# Patient Record
Sex: MALE | Race: BLACK OR AFRICAN AMERICAN | Employment: OTHER | URBAN - METROPOLITAN AREA
[De-identification: names, ages, dates, MRNs, and addresses within clinical notes are randomized per-mention and may not be internally consistent; named-entity substitution may affect disease eponyms.]

---

## 2020-05-14 ENCOUNTER — APPOINTMENT (OUTPATIENT)
Dept: CT IMAGING | Age: 70
DRG: 065 | End: 2020-05-14
Attending: EMERGENCY MEDICINE
Payer: COMMERCIAL

## 2020-05-14 ENCOUNTER — HOSPITAL ENCOUNTER (INPATIENT)
Age: 70
LOS: 4 days | Discharge: HOME HEALTH | DRG: 065 | End: 2020-05-18
Attending: EMERGENCY MEDICINE | Admitting: INTERNAL MEDICINE
Payer: COMMERCIAL

## 2020-05-14 DIAGNOSIS — I63.9 ACUTE ISCHEMIC STROKE (HCC): ICD-10-CM

## 2020-05-14 DIAGNOSIS — I16.1 HYPERTENSIVE EMERGENCY: ICD-10-CM

## 2020-05-14 DIAGNOSIS — N28.9 RENAL INSUFFICIENCY: ICD-10-CM

## 2020-05-14 DIAGNOSIS — R29.810 FACIAL DROOP: Primary | ICD-10-CM

## 2020-05-14 DIAGNOSIS — R77.8 ELEVATED TROPONIN: ICD-10-CM

## 2020-05-14 LAB
AMPHET UR QL SCN: NEGATIVE
ANION GAP SERPL CALC-SCNC: 5 MMOL/L (ref 3–18)
BARBITURATES UR QL SCN: NEGATIVE
BASOPHILS # BLD: 0 K/UL (ref 0–0.1)
BASOPHILS NFR BLD: 0 % (ref 0–2)
BENZODIAZ UR QL: NEGATIVE
BUN SERPL-MCNC: 25 MG/DL (ref 7–18)
BUN/CREAT SERPL: 17 (ref 12–20)
CALCIUM SERPL-MCNC: 9.1 MG/DL (ref 8.5–10.1)
CANNABINOIDS UR QL SCN: NEGATIVE
CHLORIDE SERPL-SCNC: 105 MMOL/L (ref 100–111)
CK MB CFR SERPL CALC: 1.1 % (ref 0–4)
CK MB SERPL-MCNC: 6.4 NG/ML (ref 5–25)
CK SERPL-CCNC: 593 U/L (ref 39–308)
CO2 SERPL-SCNC: 28 MMOL/L (ref 21–32)
COCAINE UR QL SCN: NEGATIVE
CREAT SERPL-MCNC: 1.45 MG/DL (ref 0.6–1.3)
DIFFERENTIAL METHOD BLD: ABNORMAL
EOSINOPHIL # BLD: 0.1 K/UL (ref 0–0.4)
EOSINOPHIL NFR BLD: 1 % (ref 0–5)
ERYTHROCYTE [DISTWIDTH] IN BLOOD BY AUTOMATED COUNT: 16.1 % (ref 11.6–14.5)
GLUCOSE BLD STRIP.AUTO-MCNC: 94 MG/DL (ref 70–110)
GLUCOSE SERPL-MCNC: 95 MG/DL (ref 74–99)
HCT VFR BLD AUTO: 39.2 % (ref 36–48)
HDSCOM,HDSCOM: NORMAL
HGB BLD-MCNC: 12.7 G/DL (ref 13–16)
LYMPHOCYTES # BLD: 2 K/UL (ref 0.9–3.6)
LYMPHOCYTES NFR BLD: 23 % (ref 21–52)
MCH RBC QN AUTO: 22.4 PG (ref 24–34)
MCHC RBC AUTO-ENTMCNC: 32.4 G/DL (ref 31–37)
MCV RBC AUTO: 69 FL (ref 74–97)
METHADONE UR QL: NEGATIVE
MONOCYTES # BLD: 0.7 K/UL (ref 0.05–1.2)
MONOCYTES NFR BLD: 8 % (ref 3–10)
NEUTS SEG # BLD: 6 K/UL (ref 1.8–8)
NEUTS SEG NFR BLD: 68 % (ref 40–73)
OPIATES UR QL: NEGATIVE
PCP UR QL: NEGATIVE
PLATELET # BLD AUTO: 218 K/UL (ref 135–420)
PMV BLD AUTO: 11.6 FL (ref 9.2–11.8)
POTASSIUM SERPL-SCNC: 3.8 MMOL/L (ref 3.5–5.5)
RBC # BLD AUTO: 5.68 M/UL (ref 4.7–5.5)
SODIUM SERPL-SCNC: 138 MMOL/L (ref 136–145)
TROPONIN I SERPL-MCNC: 0.09 NG/ML (ref 0–0.04)
WBC # BLD AUTO: 8.8 K/UL (ref 4.6–13.2)

## 2020-05-14 PROCEDURE — 96374 THER/PROPH/DIAG INJ IV PUSH: CPT

## 2020-05-14 PROCEDURE — 82962 GLUCOSE BLOOD TEST: CPT

## 2020-05-14 PROCEDURE — 82550 ASSAY OF CK (CPK): CPT

## 2020-05-14 PROCEDURE — 93005 ELECTROCARDIOGRAM TRACING: CPT

## 2020-05-14 PROCEDURE — 99285 EMERGENCY DEPT VISIT HI MDM: CPT

## 2020-05-14 PROCEDURE — 85025 COMPLETE CBC W/AUTO DIFF WBC: CPT

## 2020-05-14 PROCEDURE — 80307 DRUG TEST PRSMV CHEM ANLYZR: CPT

## 2020-05-14 PROCEDURE — 74011250636 HC RX REV CODE- 250/636: Performed by: EMERGENCY MEDICINE

## 2020-05-14 PROCEDURE — 65270000029 HC RM PRIVATE

## 2020-05-14 PROCEDURE — 80048 BASIC METABOLIC PNL TOTAL CA: CPT

## 2020-05-14 PROCEDURE — 74011250637 HC RX REV CODE- 250/637: Performed by: INTERNAL MEDICINE

## 2020-05-14 PROCEDURE — 70450 CT HEAD/BRAIN W/O DYE: CPT

## 2020-05-14 PROCEDURE — 74011250637 HC RX REV CODE- 250/637: Performed by: EMERGENCY MEDICINE

## 2020-05-14 RX ORDER — ASPIRIN 325 MG
325 TABLET ORAL
Status: DISCONTINUED | OUTPATIENT
Start: 2020-05-14 | End: 2020-05-14

## 2020-05-14 RX ORDER — ASPIRIN 600 MG/1
300 SUPPOSITORY RECTAL
Status: DISCONTINUED | OUTPATIENT
Start: 2020-05-14 | End: 2020-05-14

## 2020-05-14 RX ORDER — ASPIRIN 325 MG
325 TABLET ORAL
Status: COMPLETED | OUTPATIENT
Start: 2020-05-14 | End: 2020-05-14

## 2020-05-14 RX ORDER — HYDRALAZINE HYDROCHLORIDE 20 MG/ML
10 INJECTION INTRAMUSCULAR; INTRAVENOUS ONCE
Status: COMPLETED | OUTPATIENT
Start: 2020-05-14 | End: 2020-05-14

## 2020-05-14 RX ORDER — CLOPIDOGREL 300 MG/1
600 TABLET, FILM COATED ORAL
Status: COMPLETED | OUTPATIENT
Start: 2020-05-14 | End: 2020-05-14

## 2020-05-14 RX ADMIN — ASPIRIN 325 MG ORAL TABLET 325 MG: 325 PILL ORAL at 23:36

## 2020-05-14 RX ADMIN — CLOPIDOGREL BISULFATE 600 MG: 300 TABLET, FILM COATED ORAL at 23:36

## 2020-05-14 RX ADMIN — HYDRALAZINE HYDROCHLORIDE 10 MG: 20 INJECTION INTRAMUSCULAR; INTRAVENOUS at 22:37

## 2020-05-15 ENCOUNTER — APPOINTMENT (OUTPATIENT)
Dept: NON INVASIVE DIAGNOSTICS | Age: 70
DRG: 065 | End: 2020-05-15
Attending: INTERNAL MEDICINE
Payer: COMMERCIAL

## 2020-05-15 ENCOUNTER — APPOINTMENT (OUTPATIENT)
Dept: MRI IMAGING | Age: 70
DRG: 065 | End: 2020-05-15
Attending: EMERGENCY MEDICINE
Payer: COMMERCIAL

## 2020-05-15 LAB
AV PEAK GRADIENT: 95.8 MMHG
CHOLEST SERPL-MCNC: 177 MG/DL
ECHO AO ROOT DIAM: 3.29 CM
ECHO AV REGURGITANT PHT: 558.4 CM
ECHO LA AREA 4C: 28.2 CM2
ECHO LA VOL 2C: 141.46 ML (ref 18–58)
ECHO LA VOL 4C: 96.65 ML (ref 18–58)
ECHO LA VOL BP: 129.17 ML (ref 18–58)
ECHO LA VOL/BSA BIPLANE: 72.98 ML/M2 (ref 16–28)
ECHO LA VOLUME INDEX A2C: 79.92 ML/M2 (ref 16–28)
ECHO LA VOLUME INDEX A4C: 54.61 ML/M2 (ref 16–28)
ECHO LV E' LATERAL VELOCITY: 6.59 CM/S
ECHO LV E' SEPTAL VELOCITY: 3.9 CM/S
ECHO LV EDV TEICHHOLZ: 89.11 ML
ECHO LV ESV TEICHHOLZ: 42.22 ML
ECHO LV INTERNAL DIMENSION DIASTOLIC: 5.68 CM (ref 4.2–5.9)
ECHO LV INTERNAL DIMENSION SYSTOLIC: 4.12 CM
ECHO LV IVSD: 2.07 CM (ref 0.6–1)
ECHO LV MASS 2D: 593 G (ref 88–224)
ECHO LV MASS INDEX 2D: 335 G/M2 (ref 49–115)
ECHO LV POSTERIOR WALL DIASTOLIC: 1.41 CM (ref 0.6–1)
ECHO LVOT DIAM: 2 CM
ECHO LVOT PEAK GRADIENT: 4.8 MMHG
ECHO LVOT PEAK VELOCITY: 109.04 CM/S
ECHO LVOT VTI: 22.09 CM
ECHO MV A VELOCITY: 42.4 CM/S
ECHO MV E DECELERATION TIME (DT): 189.9 MS
ECHO MV E VELOCITY: 58.65 CM/S
ECHO MV E/A RATIO: 1.38
ECHO MV E/E' LATERAL: 8.9
ECHO MV E/E' RATIO (AVERAGED): 11.97
ECHO MV E/E' SEPTAL: 15.04
ECHO RV TAPSE: 2.06 CM (ref 1.5–2)
ECHO TV REGURGITANT MAX VELOCITY: 256.51 CM/S
ECHO TV REGURGITANT PEAK GRADIENT: 26.3 MMHG
EST. AVERAGE GLUCOSE BLD GHB EST-MCNC: 134 MG/DL
GLUCOSE BLD STRIP.AUTO-MCNC: 102 MG/DL (ref 70–110)
GLUCOSE BLD STRIP.AUTO-MCNC: 110 MG/DL (ref 70–110)
GLUCOSE BLD STRIP.AUTO-MCNC: 130 MG/DL (ref 70–110)
GLUCOSE BLD STRIP.AUTO-MCNC: 191 MG/DL (ref 70–110)
HBA1C MFR BLD: 6.3 % (ref 4.2–5.6)
HDLC SERPL-MCNC: 63 MG/DL (ref 40–60)
HDLC SERPL: 2.8 {RATIO} (ref 0–5)
IRON SATN MFR SERPL: 16 % (ref 20–50)
IRON SERPL-MCNC: 45 UG/DL (ref 50–175)
LDLC SERPL CALC-MCNC: 105.2 MG/DL (ref 0–100)
LIPID PROFILE,FLP: ABNORMAL
LVFS 2D: 27.4 %
LVOT MG: 2.45 MMHG
LVOT MV: 0.71 CM/S
LVSV (TEICH): 46.88 ML
MV DEC SLOPE: 3.09
PISA AR MAX VEL: 489.4 CM/S
TIBC SERPL-MCNC: 285 UG/DL (ref 250–450)
TRIGL SERPL-MCNC: 44 MG/DL (ref ?–150)
TROPONIN I SERPL-MCNC: 0.1 NG/ML (ref 0–0.04)
TROPONIN I SERPL-MCNC: 0.21 NG/ML (ref 0–0.04)
TROPONIN I SERPL-MCNC: 0.22 NG/ML (ref 0–0.04)
TROPONIN I SERPL-MCNC: 0.29 NG/ML (ref 0–0.04)
VLDLC SERPL CALC-MCNC: 8.8 MG/DL

## 2020-05-15 PROCEDURE — 83036 HEMOGLOBIN GLYCOSYLATED A1C: CPT

## 2020-05-15 PROCEDURE — 92523 SPEECH SOUND LANG COMPREHEN: CPT

## 2020-05-15 PROCEDURE — 74011250637 HC RX REV CODE- 250/637: Performed by: INTERNAL MEDICINE

## 2020-05-15 PROCEDURE — 82962 GLUCOSE BLOOD TEST: CPT

## 2020-05-15 PROCEDURE — 93306 TTE W/DOPPLER COMPLETE: CPT

## 2020-05-15 PROCEDURE — 74011250636 HC RX REV CODE- 250/636

## 2020-05-15 PROCEDURE — 74011250636 HC RX REV CODE- 250/636: Performed by: INTERNAL MEDICINE

## 2020-05-15 PROCEDURE — 70547 MR ANGIOGRAPHY NECK W/O DYE: CPT

## 2020-05-15 PROCEDURE — 36415 COLL VENOUS BLD VENIPUNCTURE: CPT

## 2020-05-15 PROCEDURE — 97162 PT EVAL MOD COMPLEX 30 MIN: CPT

## 2020-05-15 PROCEDURE — 74011000250 HC RX REV CODE- 250: Performed by: INTERNAL MEDICINE

## 2020-05-15 PROCEDURE — 84484 ASSAY OF TROPONIN QUANT: CPT

## 2020-05-15 PROCEDURE — 92610 EVALUATE SWALLOWING FUNCTION: CPT

## 2020-05-15 PROCEDURE — 77010033678 HC OXYGEN DAILY

## 2020-05-15 PROCEDURE — 83540 ASSAY OF IRON: CPT

## 2020-05-15 PROCEDURE — 70544 MR ANGIOGRAPHY HEAD W/O DYE: CPT

## 2020-05-15 PROCEDURE — 65660000000 HC RM CCU STEPDOWN

## 2020-05-15 PROCEDURE — 97165 OT EVAL LOW COMPLEX 30 MIN: CPT

## 2020-05-15 PROCEDURE — 70551 MRI BRAIN STEM W/O DYE: CPT

## 2020-05-15 PROCEDURE — 80061 LIPID PANEL: CPT

## 2020-05-15 RX ORDER — METOPROLOL TARTRATE 25 MG/1
12.5 TABLET, FILM COATED ORAL EVERY 12 HOURS
Status: DISCONTINUED | OUTPATIENT
Start: 2020-05-15 | End: 2020-05-16

## 2020-05-15 RX ORDER — ATORVASTATIN CALCIUM 40 MG/1
80 TABLET, FILM COATED ORAL
Status: DISCONTINUED | OUTPATIENT
Start: 2020-05-15 | End: 2020-05-18 | Stop reason: HOSPADM

## 2020-05-15 RX ORDER — ASPIRIN 81 MG/1
81 TABLET ORAL DAILY
Status: DISCONTINUED | OUTPATIENT
Start: 2020-05-16 | End: 2020-05-18 | Stop reason: HOSPADM

## 2020-05-15 RX ORDER — CLOPIDOGREL BISULFATE 75 MG/1
75 TABLET ORAL DAILY
Status: DISCONTINUED | OUTPATIENT
Start: 2020-05-15 | End: 2020-05-18 | Stop reason: HOSPADM

## 2020-05-15 RX ORDER — ACETAMINOPHEN 325 MG/1
650 TABLET ORAL
Status: DISCONTINUED | OUTPATIENT
Start: 2020-05-15 | End: 2020-05-18 | Stop reason: HOSPADM

## 2020-05-15 RX ORDER — LABETALOL HCL 20 MG/4 ML
SYRINGE (ML) INTRAVENOUS
Status: COMPLETED
Start: 2020-05-15 | End: 2020-05-15

## 2020-05-15 RX ORDER — AMLODIPINE BESYLATE 5 MG/1
5 TABLET ORAL DAILY
Status: DISCONTINUED | OUTPATIENT
Start: 2020-05-15 | End: 2020-05-16

## 2020-05-15 RX ORDER — HEPARIN SODIUM 5000 [USP'U]/ML
INJECTION, SOLUTION INTRAVENOUS; SUBCUTANEOUS
Status: DISPENSED
Start: 2020-05-15 | End: 2020-05-15

## 2020-05-15 RX ORDER — SODIUM CHLORIDE 9 MG/ML
10 INJECTION INTRAMUSCULAR; INTRAVENOUS; SUBCUTANEOUS
Status: COMPLETED | OUTPATIENT
Start: 2020-05-15 | End: 2020-05-15

## 2020-05-15 RX ORDER — HEPARIN SODIUM 5000 [USP'U]/ML
5000 INJECTION, SOLUTION INTRAVENOUS; SUBCUTANEOUS EVERY 8 HOURS
Status: DISCONTINUED | OUTPATIENT
Start: 2020-05-15 | End: 2020-05-18 | Stop reason: HOSPADM

## 2020-05-15 RX ORDER — LABETALOL HCL 20 MG/4 ML
5 SYRINGE (ML) INTRAVENOUS
Status: DISCONTINUED | OUTPATIENT
Start: 2020-05-15 | End: 2020-05-16

## 2020-05-15 RX ADMIN — CLOPIDOGREL BISULFATE 75 MG: 75 TABLET ORAL at 10:17

## 2020-05-15 RX ADMIN — LABETALOL 20 MG/4 ML (5 MG/ML) INTRAVENOUS SYRINGE 5 MG: at 10:16

## 2020-05-15 RX ADMIN — SODIUM CHLORIDE 10 ML: 9 INJECTION INTRAMUSCULAR; INTRAVENOUS; SUBCUTANEOUS at 09:31

## 2020-05-15 RX ADMIN — AMLODIPINE BESYLATE 5 MG: 5 TABLET ORAL at 16:11

## 2020-05-15 RX ADMIN — HEPARIN SODIUM 5000 UNITS: 5000 INJECTION INTRAVENOUS; SUBCUTANEOUS at 20:17

## 2020-05-15 RX ADMIN — LABETALOL 20 MG/4 ML (5 MG/ML) INTRAVENOUS SYRINGE 5 MG: at 03:33

## 2020-05-15 RX ADMIN — ATORVASTATIN CALCIUM 80 MG: 40 TABLET, FILM COATED ORAL at 03:32

## 2020-05-15 RX ADMIN — METOPROLOL TARTRATE 12.5 MG: 25 TABLET, FILM COATED ORAL at 16:11

## 2020-05-15 RX ADMIN — HEPARIN SODIUM 5000 UNITS: 5000 INJECTION INTRAVENOUS; SUBCUTANEOUS at 03:31

## 2020-05-15 RX ADMIN — HEPARIN SODIUM 5000 UNITS: 5000 INJECTION INTRAVENOUS; SUBCUTANEOUS at 10:16

## 2020-05-15 RX ADMIN — ATORVASTATIN CALCIUM 80 MG: 40 TABLET, FILM COATED ORAL at 21:23

## 2020-05-15 RX ADMIN — METOPROLOL TARTRATE 12.5 MG: 25 TABLET, FILM COATED ORAL at 21:24

## 2020-05-15 NOTE — ED PROVIDER NOTES
EMERGENCY DEPARTMENT HISTORY AND PHYSICAL EXAM    10:09 PM      Date: 5/14/2020  Patient Name: Lory Boeck    History of Presenting Illness     Chief Complaint   Patient presents with    Stroke         History Provided By: Patient    Additional History (Context): Lory Boeck is a 78 y.o. male with Patient denying having any medical problems, but he admits he has not seen a doctor in years, who presents with chief complaint of right facial droop that started 4 days ago. Patient's family urged patient to be seen and he arrives to the ED via EMS. Patient family stated that his voice sounded slurred and family got him to come to the emergency department tonight. He does admit that he \"sort of noticed\" his face was droopy 4 days ago but did not think much about it. Patient denies any weakness, headache, dizziness, visual change, numbness, chest pain, shortness of breath, fever, sore throat, runny nose, cough, no exposure to anyone with COVID-19, no other complaint. PCP: No primary care provider on file. Past History     Past Medical History:  History reviewed. No pertinent past medical history. Past Surgical History:  History reviewed. No pertinent surgical history. Family History:  History reviewed. No pertinent family history. Social History:  Social History     Tobacco Use    Smoking status: Never Smoker    Smokeless tobacco: Never Used   Substance Use Topics    Alcohol use: Not Currently    Drug use: Not on file       Allergies:  No Known Allergies      Review of Systems       Review of Systems   Constitutional: Negative for chills and fever. HENT: Negative for congestion, rhinorrhea, sore throat and trouble swallowing. Eyes: Negative for visual disturbance. Respiratory: Negative for cough, shortness of breath and wheezing. Cardiovascular: Negative for chest pain and leg swelling. Gastrointestinal: Negative for abdominal pain, nausea and vomiting.    Endocrine: Negative for polyuria. Genitourinary: Negative for difficulty urinating and dysuria. Musculoskeletal: Negative for arthralgias and neck stiffness. Skin: Negative for rash. Neurological: Positive for facial asymmetry. Negative for dizziness, weakness, numbness and headaches. Speech change   Hematological: Does not bruise/bleed easily. Psychiatric/Behavioral: Negative for confusion and dysphoric mood. All other systems reviewed and are negative. Physical Exam     Visit Vitals  BP (!) 210/101   Pulse (!) 59   Temp 98.2 °F (36.8 °C)   Resp 18   Ht 5' 6\" (1.676 m)   Wt 68.2 kg (150 lb 6.4 oz)   SpO2 99%   BMI 24.28 kg/m²         Physical Exam  Vitals signs and nursing note reviewed. Constitutional:       General: He is not in acute distress. Appearance: He is well-developed. He is not diaphoretic. HENT:      Head: Normocephalic and atraumatic. Eyes:      General: No scleral icterus. Conjunctiva/sclera: Conjunctivae normal.      Pupils: Pupils are equal, round, and reactive to light. Neck:      Musculoskeletal: Normal range of motion and neck supple. Cardiovascular:      Rate and Rhythm: Normal rate. Comments: Capillary refill < 3 seconds  Pulmonary:      Effort: Pulmonary effort is normal. No respiratory distress. Breath sounds: Normal breath sounds. No wheezing. Abdominal:      General: Bowel sounds are normal. There is no distension. Palpations: Abdomen is soft. Tenderness: There is no abdominal tenderness. Musculoskeletal: Normal range of motion. General: No tenderness. Right lower leg: No edema. Left lower leg: No edema. Lymphadenopathy:      Cervical: No cervical adenopathy. Skin:     General: Skin is warm and dry. Coloration: Skin is not jaundiced or pale. Neurological:      Mental Status: He is alert and oriented to person, place, and time. Cranial Nerves: No cranial nerve deficit.       Comments: Right facial droop With slight slurring of speech  No cerebellar ataxia on finger-nose test  No drifting  Strength 5 out of 5 bilateral upper and lower extremities  EOMI  Sensation intact    NIH stroke score 2   Psychiatric:         Thought Content: Thought content normal.           Diagnostic Study Results     Labs -  Recent Results (from the past 12 hour(s))   METABOLIC PANEL, BASIC    Collection Time: 05/14/20  7:43 PM   Result Value Ref Range    Sodium 138 136 - 145 mmol/L    Potassium 3.8 3.5 - 5.5 mmol/L    Chloride 105 100 - 111 mmol/L    CO2 28 21 - 32 mmol/L    Anion gap 5 3.0 - 18 mmol/L    Glucose 95 74 - 99 mg/dL    BUN 25 (H) 7.0 - 18 MG/DL    Creatinine 1.45 (H) 0.6 - 1.3 MG/DL    BUN/Creatinine ratio 17 12 - 20      GFR est AA 58 (L) >60 ml/min/1.73m2    GFR est non-AA 48 (L) >60 ml/min/1.73m2    Calcium 9.1 8.5 - 10.1 MG/DL   CBC WITH AUTOMATED DIFF    Collection Time: 05/14/20  7:43 PM   Result Value Ref Range    WBC 8.8 4.6 - 13.2 K/uL    RBC 5.68 (H) 4.70 - 5.50 M/uL    HGB 12.7 (L) 13.0 - 16.0 g/dL    HCT 39.2 36.0 - 48.0 %    MCV 69.0 (L) 74.0 - 97.0 FL    MCH 22.4 (L) 24.0 - 34.0 PG    MCHC 32.4 31.0 - 37.0 g/dL    RDW 16.1 (H) 11.6 - 14.5 %    PLATELET 254 916 - 197 K/uL    MPV 11.6 9.2 - 11.8 FL    NEUTROPHILS 68 40 - 73 %    LYMPHOCYTES 23 21 - 52 %    MONOCYTES 8 3 - 10 %    EOSINOPHILS 1 0 - 5 %    BASOPHILS 0 0 - 2 %    ABS. NEUTROPHILS 6.0 1.8 - 8.0 K/UL    ABS. LYMPHOCYTES 2.0 0.9 - 3.6 K/UL    ABS. MONOCYTES 0.7 0.05 - 1.2 K/UL    ABS. EOSINOPHILS 0.1 0.0 - 0.4 K/UL    ABS.  BASOPHILS 0.0 0.0 - 0.1 K/UL    DF AUTOMATED     CARDIAC PANEL,(CK, CKMB & TROPONIN)    Collection Time: 05/14/20  7:43 PM   Result Value Ref Range    CK - MB 6.4 (H) <3.6 ng/ml    CK-MB Index 1.1 0.0 - 4.0 %     (H) 39 - 308 U/L    Troponin-I, QT 0.09 (H) 0.0 - 0.045 NG/ML   EKG, 12 LEAD, INITIAL    Collection Time: 05/14/20  7:52 PM   Result Value Ref Range    Ventricular Rate 72 BPM    Atrial Rate 72 BPM    P-R Interval 202 ms QRS Duration 98 ms    Q-T Interval 414 ms    QTC Calculation (Bezet) 453 ms    Calculated P Axis 43 degrees    Calculated R Axis 40 degrees    Calculated T Axis -9 degrees    Diagnosis       Normal sinus rhythm  Possible Left atrial enlargement  Left ventricular hypertrophy with repolarization abnormality  Abnormal ECG  No previous ECGs available     DRUG SCREEN, URINE    Collection Time: 05/14/20  9:50 PM   Result Value Ref Range    BENZODIAZEPINES Negative NEG      BARBITURATES Negative NEG      THC (TH-CANNABINOL) Negative NEG      OPIATES Negative NEG      PCP(PHENCYCLIDINE) Negative NEG      COCAINE Negative NEG      AMPHETAMINES Negative NEG      METHADONE Negative NEG      HDSCOM (NOTE)    GLUCOSE, POC    Collection Time: 05/14/20 10:38 PM   Result Value Ref Range    Glucose (POC) 94 70 - 110 mg/dL       Radiologic Studies -   CT HEAD WO CONT   Final Result   IMPRESSION:   1. No intracranial hemorrhage. 2. Moderate burden of periventricular and deep hemispheric small vessel disease   change, with one small superimposed left periventricular lucency which could   reflect age-indeterminate infarct.   -Several other scattered cystic lucencies most consistent with remote lacunar   type infarcts. MRI BRAIN WO CONT    (Results Pending)   MRA BRAIN WO CONT    (Results Pending)   MRA NECK WO CONT    (Results Pending)         Medical Decision Making   I am the first provider for this patient. I reviewed the vital signs, available nursing notes, past medical history, past surgical history, family history and social history. Vital Signs-Reviewed the patient's vital signs. Pulse Oximetry Analysis -  99 on room air (Interpretation) normal    Cardiac Monitor:  Rate:   Rhythm:  Normal Sinus Rhythm     EKG: Interpreted by the EP Dr. Mario Leonard.    Time Interpreted: 36   Rate: 72   Rhythm: Normal Sinus Rhythm    Interpretation: Normal QRS duration, normal axis, no ST elevation, no ST depressions, possible LVH, has T wave inversions in V5 and V6       Records Reviewed: Nursing Notes and Old Medical Records (Time of Review: 10:09 PM)    Provider Notes (Medical Decision Making): DDX: Brain mass, stroke, metabolic    Patient symptoms started 4 days ago  Elevated blood pressure    We will consult tele-neurology  Patient outside window for TPA or intervention    MDM    Medications   clopidogreL (PLAVIX) 600 mg (has no administration in time range)   aspirin tablet 325 mg (has no administration in time range)   hydrALAZINE (APRESOLINE) 20 mg/mL injection 10 mg (10 mg IntraVENous Given 5/14/20 2237)           ED Course: Progress Notes, Reevaluation, and Consults:  WBC within normal limits  Creatinine 1.45    Consult:  Discussed care with Dr. Dilan Good, Specialty: Allergies, standard discussion; including history of patients chief complaint, available diagnostic results, and treatment course. He will evaluate patient  10:27 PM, 5/14/2020     Dr. Dilan Good evaluated patient and states admitted for acute ischemic stroke. States admit patient for stroke work-up, get nonemergent MRI brain, MRA head and neck, give 325 mg aspirin and 600 mg of Plavix. He states agrees that I had given a dose of hydralazine now, then can have permissive hypertension with systolic BP not above 770    is outside window for TPA or intervention, symptoms started days ago. Consult:  Discussed care with Dr. Jeremias Maldonado, Specialty: Hospitalist, standard discussion; including history of patients chief complaint, available diagnostic results, and treatment course. She accepts admission to neuro floor  10:41 PM, 5/14/2020     Critical care time:   I have spent 48 minutes of critical care time involved in lab review, consultations with specialist, family decision making, and documentation. During this entire length of time I was immediately available to the patient. Critical care:  The reason for providing this level of medical care for this critically ill patient was due to a critical illness that impaired one or more vital organ systems such that there was a high probability of imminent or life threatening deterioration in the patients condition. This care involved high complexity decision making to assess, manipulate and support vital system functions, to treat this degree vital organ system failure and to prevent further life threatening deterioration of the patient's condition. Diagnosis     Clinical Impression:   1. Facial droop    2. Acute ischemic stroke ([facility] [address]    3. Hypertensive emergency    4. Renal insufficiency    5. Elevated troponin        Disposition: Admitted    Follow-up Information    None          Patient's Medications    No medications on file         DO Charlene Lamb medical dictation software was used for portions of this report. Unintended transcription errors may occur. My signature above authenticates this document and my orders, the final    diagnosis (es), discharge prescription (s), and instructions in the Epic    record.

## 2020-05-15 NOTE — PROGRESS NOTES
Called Dr. Janee Victoria office and confirmed that Dr. Davonte Daniel is pt's pcp and he has appointment on Wednesday the 20th. Dr. Stuart Pitts aware.         Silvestre Lopes, BSN RN  Care Management  Pager: [phone]

## 2020-05-15 NOTE — PROGRESS NOTES
Problem: TIA/CVA Stroke: 0-24 hours  Goal: Activity/Safety  Outcome: Progressing Towards Goal  Goal: Diagnostic Test/Procedures  Outcome: Progressing Towards Goal  Goal: Nutrition/Diet  Outcome: Progressing Towards Goal  Goal: Medications  Outcome: Progressing Towards Goal  Goal: Respiratory  Outcome: Progressing Towards Goal  Goal: Minimize risk of bleeding post-thrombolytic infusion  Outcome: Progressing Towards Goal  Goal: Psychosocial  Outcome: Progressing Towards Goal  Goal: *Hemodynamically stable  Outcome: Progressing Towards Goal  Goal: *Ability to perform ADLs and demonstrates progressive mobility and function  Outcome: Progressing Towards Goal  Goal: *Stroke education started(Stroke Metric)  Outcome: Progressing Towards Goal  Goal: *Dysphagia screen performed(Stroke Metric)  Outcome: Progressing Towards Goal

## 2020-05-15 NOTE — ED NOTES
TRANSFER - OUT REPORT:    Verbal report given to receiving nurse on Evin Traylor  being transferred to [address] [place] [address] Drive for routine progression of care       Report consisted of patients Situation, Background, Assessment and   Recommendations(SBAR). Information from the following report(s) SBAR, ED Summary and MAR was reviewed with the receiving nurse. Lines:   Peripheral IV 05/14/20 Left Forearm (Active)        Opportunity for questions and clarification was provided.       Patient transported with:   [employer]

## 2020-05-15 NOTE — PROGRESS NOTES
Reason for Admission:  Acute ischemic stroke ([facility] [address] [I63.9]  Facial droop [R29.810]  Hypertensive emergency [I16.1]                 RRAT Score:    10            Plan for utilizing home health: To be determined                      Likelihood of Readmission:   LOW                         Transition of Care Plan:              Initial assessment completed with patient. Cognitive status of patient: oriented to time, place, person and situation. Face sheet information confirmed:  yes. The patient designates his sister Theresa Alejandro [phone] to participate in his discharge plan and to receive any needed information. This patient lives in a home alone. Patient is able to navigate steps as needed. Prior to hospitalization, patient was considered to be independent with ADLs/IADLS : yes . Patient has a current ACP document on file: no  The pt stated he will call a cab to transport him home upon discharge. The patient already has no medical equipment available in the home. Patient is not currently active with home health. Patient has not stayed in a skilled nursing facility or rehab. Was  stay within last 60 days : no. This patient is on dialysis :no    Currently, the discharge plan is to be determined    The patient states that he can obtain his medications from the pharmacy, and take his medications as directed. Patient's current insurance is [employer]. Pt stated his address is [address] [place] [address]. He stated he has a new pcp and has appointment next Wednesday but he cannot remember the name of the pcp but he knows the office is on [address] [facility] [address].       Care Management Interventions  PCP Verified by CM: No(per pt he has a new pcp and first appointment is coming Ni but he cannot remember the name of the pcp)  Palliative Care Criteria Met (RRAT>21 & CHF Dx)?: No  Mode of Transport at Discharge: Self  Transition of Care Consult (CM Consult): Discharge Planning  Physical Therapy Consult: Yes  Occupational Therapy Consult: Yes  Speech Therapy Consult: No  Current Support Network: Lives Alone  Confirm Follow Up Transport: Self  Discharge Location  Discharge Placement: Unable to determine at this time        WIN Durán RN  Care Management  Pager: [phone]

## 2020-05-15 NOTE — CONSULTS
Earline Amezquita is a 78 y.o., right handed male, with an established history of hypertension, no history of stroke comes in with onset of right-sided weakness and slurred speech. This started Monday prior to his admission. States that prior to that he was in his usual state of health. He was noted by his sister to have slurred speech and she insisted that she come into the hospital.  Please note that his symptoms started 3 days prior to his admission. He denies any overt weakness of his arms and his legs. He denies any language dysfunction but cannot write clearly. No vision changes no loss or alteration of consciousness. Social History; the patient is single lives alone down here in [facility] but has a significant other up in [place]. He does not smoke drinks only socially. He works for the [employer]. Family History; both parents passed away. Mother had stroke. Father  of alcohol abuse siblings have hypertension. Current Facility-Administered Medications   Medication Dose Route Frequency Provider Last Rate Last Dose    clopidogreL (PLAVIX) tablet 75 mg  75 mg Oral DAILY Yuliana Mckeon MD   75 mg at 05/15/20 1017    atorvastatin (LIPITOR) tablet 80 mg  80 mg Oral QHS Yuliana Mckeon MD   80 mg at 05/15/20 0332    acetaminophen (TYLENOL) tablet 650 mg  650 mg Oral Q4H PRN Yuliana Mckeon MD        labetaloL (NORMODYNE;TRANDATE) 20 mg/4 mL (5 mg/mL) injection 5 mg  5 mg IntraVENous Q10MIN PRN Yuliana Mckeon MD   5 mg at 05/15/20 1016    heparin (porcine) injection 5,000 Units  5,000 Units SubCUTAneous Q8H Yuliana Mckeon MD   5,000 Units at 05/15/20 1016    amLODIPine (NORVASC) tablet 5 mg  5 mg Oral DAILY Minda Ash MD        [START ON 2020] aspirin delayed-release tablet 81 mg  81 mg Oral DAILY Minda Ash MD        metoprolol tartrate (LOPRESSOR) tablet 12.5 mg  12.5 mg Oral Q12H Minda Ash MD           History reviewed.  No pertinent past medical history. History reviewed. No pertinent surgical history. No Known Allergies    Patient Active Problem List   Diagnosis Code    Facial droop R29.810    [facility] I[facility]9    Hypertensive emergency I16.1         Review of Systems:   As above otherwise 11 point review of systems negative including;   Constitutional no fever or chills  Skin denies rash or itching  HENT  Denies tinnitus, hearing lose  Eyes denies diplopia vision lose  Respiratory denies shortness of breath  Cardiovascular denies chest pain, dyspnea on exertion  Gastrointestinal denies nausea, vomiting, diarrhea, constipation  Genitourinary denies incontinence  Musculoskeletal denies joint pain or swelling  Endocrine denies weight change  Hematology denies easy bruising or bleeding   Neurological as above in HPI      PHYSICAL EXAMINATION:      VITAL SIGNS:    Visit Vitals  BP (!) 206/100 (BP 1 Location: Right arm, BP Patient Position: At rest;Sitting) Comment (BP Patient Position): bed in chair position   Pulse 60   Temp 97.7 °F (36.5 °C)   Resp 20   Ht 5' 6\" (1.676 m)   Wt 68 kg (150 lb)   SpO2 98%   BMI 24.21 kg/m²       GENERAL: The patient is well developed, well nourished, and in no apparent distress. EXTREMITIES: No clubbing, cyanosis, or edema is identified. Pulses 2+ and symmetrical.  Muscle tone is normal.  HEAD:   Ear, nose, and throat appear to be without trauma. The patient is normocephalic. NEUROLOGIC EXAMINATION    MENTAL STATUS: The patient is awake, alert, and oriented x 4. Fund of knowledge is adequate. Speech is slurred but fluent and memory appears to be intact, both long and short term. CRANIAL NERVES: II - Visual fields are full to confrontation. Funduscopic examination reveals flat disks bilaterally. Pupils are both 4 mm and briskly reactive to light and accommodation. III, IV, VI - Extraocular movements are intact and there is no nystagmus.    V - Facial sensation is intact to pinprick and light touch.  VII - Face is asymmetrical, he has a significant right facial weakness. Rosalio Founds VIII - Hearing is present. IX, X, XII- Palate rises symmetrically. Gag is present. Tongue is in the midline. XI - Shoulder shrugging and head turning intact  MOTOR:  The patient is 5/5 in all four limbs without any drift. Fine finger movements are asymmetrical, slightly slower on the right compared to the left side. Isolated motor group testing reveals no focal abnormalities. Tone is normal.  Sensory examination is intact to pinprick, light touch and position sense testing. Reflexes are 2+ and symmetrical. Plantars are down going. Cerebellar examination reveals no gross ataxia or dysmetria. Gait is normal and the patient can tandem walk without any difficulty. Final result (Exam End: 5/15/2020 01:54) Provider Status: Open   Study Result     Brain MR without contrast     HISTORY: Right facial droop     COMPARISON: CT 5/14/2020     TECHNIQUE: Brain scanned with sagittal and axial T1W scans, axial T2W , axial  FLAIR, axial diffusion weighted images and SWAN.    FINDINGS:      Cerebral parenchyma: 1.5 x 1 cm axial plane restricted diffusion abnormality in  the left corona radiata extending to the dorsal lentiform nucleus. Moderately  extensive amount of confluent T2 and FLAIR hyperintense periventricular and  beyond signal abnormality. Couple of old white matter infarcts in the right  forceps minor region of the frontal lobe. No mass effect or mass lesion. Curvilinear focus of susceptibility blooming artifact in the subcortical right  parietal lobe with hypointense signal visible on T2-weighted imaging. Probably a  tiny amount of adjacent cortical gliosis.  Multiple tiny nodular foci of  susceptibility artifact in the cortical bilateral temporal and left parietal  lobes.     Brain volumes and ventricular system: Normal in size and morphology for the  patient's age.     Midline structures: Normal.     Cerebellum: Normal except for a small focus of susceptibility in the left  ventral cerebellum.     Brainstem: Old right central pontine lacunar infarct. Separate focus of  susceptibility artifact in the right lateral diamond.     Vascular system: Expected arterial flow voids are present at the base of brain.     Calvarium and skull base: Normal.     Paranasal sinuses and mastoid air cells: Small mucous retention cyst in the left  maxillary sinus.     Visualized orbits: Unremarkable for a nondedicated exam.     Visualized upper cervical spine: Unremarkable for a nondedicated exam.     IMPRESSION  IMPRESSION:     1.  Early subacute lacunar infarct in the left corona radiata to basal ganglia. 2.  Chronic lacunar infarcts in the right forceps minor region of the frontal  lobe and right central diamond. 3.  Moderately extensive burden of chronic microvascular ischemic white matter  disease. 4.  Chronic microhemorrhages, indeterminant etiology but could be amyloid or  hypertensive angiopathy. 5.  Larger curvilinear focus of susceptibility artifact with associated cortical  gliosis in the right parietal lobe most likely representing an old hemorrhagic  infarct.        Final result (Exam End: 5/15/2020 01:47) Provider Status: Open   Study Result     MRA head without contrast     HISTORY: Right-sided facial droop     COMPARISON: None     TECHNIQUE: 3-D time of flight MR angiography of the intracranial arteries was  obtained and portrayed in raw data and maximum intensity projection formats.        FINDINGS:     Right anterior circulation:   ICA: Patent. Stenosis at the distal cervical to petrous ICA junction is most  likely artifact or at least exaggerated by artifact. Mild stenosis at the  proximal cavernous segment. WALI: Patent. Nonflow limiting, moderate to severe, stenosis of the proximal A1. MCA: Patent. No M1 stenosis or proximal M2 trunk occlusion.     Left anterior circulation:  ICA: Patent. Similar appearing stenosis at the distal cervical to petrous ICA  junction with the amount of narrowing likely exaggerated by artifact. WALI: Patent. No A1 stenosis. MCA: Patent. Estimated moderate stenosis at the distal M1. No proximal branch  occlusion.     Posterior circulation:   RVA: Patent. No stenosis. LVA: Patent. No stenosis. Basilar: Patent. No stenosis. RPCA: Patent. No flow limiting stenosis. LPCA: Patent. Severe distal P2 stenosis with maintained downstream flow.        IMPRESSION  IMPRESSION:     No evidence of a proximal arterial occlusion.     Multifocal stenoses. -Bilateral ICA stenoses at the distal cervical to petrous junctions are very  likely exaggerated by motion artifact.  -Nonflow limiting but moderate to severe appearing stenosis of the RACA A1  segment. -Moderate stenosis at the distal M1 LMCA. -Severe P2 LPCA stenosis. Final result (Exam End: 5/15/2020 02:18) Provider Status: Open   Study Result     MRA neck without contrast        HISTORY: Right facial droop     COMPARISON: None     TECHNIQUE: Noncontrast scanning of the neck arteries was accomplished with TOF  and Inhance velocity technique. Data was then processed with MIP algorithm.     FINDINGS:      Right carotid:  -CCA: Patent. -ECA: Patent. -ICA: Patent. Retropharyngeal course. Limited evaluation for stenosis however no  critical or flow-limiting stenosis by NASCET criteria.     Left carotid:   -CCA: Patent. -ECA: Patent. -ICA: Patent. Limited evaluation for stenosis however no critical or  flow-limiting stenosis by NASCET criteria.     Right vertebral: Patent with antegrade flow. No flow-limiting stenosis.     Left vertebral: Patent with antegrade flow. No flow-limiting stenosis.       IMPRESSION  IMPRESSION:     Limited noncontrast evaluation however patent extracranial cerebral arteries  without evidence of a flow-limiting stenosis. I have reviewed the above imagines myself.        CBC:   Lab Results   Component Value Date/Time    WBC 8.8 05/14/2020 07:43 PM    RBC 5.68 (H) 05/14/2020 07:43 PM    HGB 12.7 (L) 05/14/2020 07:43 PM    HCT 39.2 05/14/2020 07:43 PM    PLATELET 054 17/96/9258 07:43 PM     BMP:   Lab Results   Component Value Date/Time    Glucose 95 05/14/2020 07:43 PM    Sodium 138 05/14/2020 07:43 PM    Potassium 3.8 05/14/2020 07:43 PM    Chloride 105 05/14/2020 07:43 PM    CO2 28 05/14/2020 07:43 PM    BUN 25 (H) 05/14/2020 07:43 PM    Creatinine 1.45 (H) 05/14/2020 07:43 PM    Calcium 9.1 05/14/2020 07:43 PM     CMP:   Lab Results   Component Value Date/Time    Glucose 95 05/14/2020 07:43 PM    Sodium 138 05/14/2020 07:43 PM    Potassium 3.8 05/14/2020 07:43 PM    Chloride 105 05/14/2020 07:43 PM    CO2 28 05/14/2020 07:43 PM    BUN 25 (H) 05/14/2020 07:43 PM    Creatinine 1.45 (H) 05/14/2020 07:43 PM    Calcium 9.1 05/14/2020 07:43 PM    Anion gap 5 05/14/2020 07:43 PM    BUN/Creatinine ratio 17 05/14/2020 07:43 PM     Coagulation: No results found for: PTP, INR, APTT, PTTT, INREXT  Cardiac markers:   Lab Results   Component Value Date/Time     (H) 05/14/2020 07:43 PM    CK-MB Index 1.1 05/14/2020 07:43 PM          Impression: New onset of left subcortical stroke in this man who has risk factors including hypertension. He has some vascular stenosis on his MR angiography. Because of this he will need to be treated with dual antiplatelet therapy. Plan: Dual antiplatelet therapy for 90 days and switch to aspirin only. Lipitor. Blood pressure and other risk factor control. From the neurologic standpoint not a lot further to do. Her stroke happened 3 days ago and after he is monitored for 24 hours for cardiac arrhythmias it is okay for him to be discharged. PLEASE NOTE:   This document has been produced using voice recognition software. Unrecognized errors in transcription may be present.

## 2020-05-15 NOTE — PROGRESS NOTES
SUBJECTIVE:    Patient continues to have right-sided facial droop. Continues to have dysarthria. No headaches or dizziness. No visual disturbances. He has been eating without any problem. No chest pain shortness of breath or cough. No nausea vomiting abdominal pain. No tingling or numbness. He walked with me in the hallway for more than 300 steps without any issues. Patient was supposed to see primary care physician on coming Wednesday next week. Denies smoking cigarettes or drinking any alcohol. Denies using any illegal drugs. He does not take any home medications. OBJECTIVE:    BP (!) 212/108   Pulse 67   Temp 97.7 °F (36.5 °C)   Resp 20   Ht 5' 6\" (1.676 m)   Wt 68 kg (150 lb)   SpO2 97%   BMI 24.21 kg/m²     General appearance - alert, well appearing, and in no distress  Eyes - sclera anicteric, no pallor  Nose - no obvious nasal discharge. Neck - supple, no JVD, trachea is midline  Chest -clear air entry noted in bases, no wheezes  Heart - S1 and S2 normal  Abdomen - soft, nontender, nondistended, Bowel sounds present  Neurological -awake, right facial droop present, dysarthric speech, motor strength 5-5 in all 4 extremities without any sensory loss to touch. Musculoskeletal - no joint tenderness or swelling of knees bilaterally  Extremities - no pedal edema noted    ASSESSMENT:    1. Right facial droop and dysarthria due to #2  2. Left coronary data to basal ganglia subacute lacunar infarct  3. Chronic microhemorrhages on MRI suggestive of hypertensive angiopathy  4.  Elevated cardiac enzymes due to demand ischemia and secondary to hypertensive heart disease  5. Hypertension  6. Hypertensive heart disease  7.   Dyslipidemia    PLAN:    Continue current management  MRI report reviewed  Continue aspirin, Plavix and statin  Echocardiogram report is pending and cardiology will be consulted for #4 and #6  Neurology consult is pending  We will start low-dose beta-blocker and Norvasc with holding parameter  Possible discharge home in 1 or 2 days depending on his clinical status    Total time to take care of this patient was greater than 35 minutes including reviewing chart, examining the patient, obtaining history from the patient, analyzing the data, coordinating the care with patient, nurses and consultant, reviewing MAR and entering orders, and documentation. Disclaimer: Sections of this note are dictated using utilizing voice recognition software, which may have resulted in some phonetic based errors in grammar and contents. Even though attempts were made to correct all the mistakes, some may have been missed, and remained in the body of the document. If questions arise, please contact our department. Recent Results (from the past 24 hour(s))   METABOLIC PANEL, BASIC    Collection Time: 05/14/20  7:43 PM   Result Value Ref Range    Sodium 138 136 - 145 mmol/L    Potassium 3.8 3.5 - 5.5 mmol/L    Chloride 105 100 - 111 mmol/L    CO2 28 21 - 32 mmol/L    Anion gap 5 3.0 - 18 mmol/L    Glucose 95 74 - 99 mg/dL    BUN 25 (H) 7.0 - 18 MG/DL    Creatinine 1.45 (H) 0.6 - 1.3 MG/DL    BUN/Creatinine ratio 17 12 - 20      GFR est AA 58 (L) >60 ml/min/1.73m2    GFR est non-AA 48 (L) >60 ml/min/1.73m2    Calcium 9.1 8.5 - 10.1 MG/DL   CBC WITH AUTOMATED DIFF    Collection Time: 05/14/20  7:43 PM   Result Value Ref Range    WBC 8.8 4.6 - 13.2 K/uL    RBC 5.68 (H) 4.70 - 5.50 M/uL    HGB 12.7 (L) 13.0 - 16.0 g/dL    HCT 39.2 36.0 - 48.0 %    MCV 69.0 (L) 74.0 - 97.0 FL    MCH 22.4 (L) 24.0 - 34.0 PG    MCHC 32.4 31.0 - 37.0 g/dL    RDW 16.1 (H) 11.6 - 14.5 %    PLATELET 082 701 - 323 K/uL    MPV 11.6 9.2 - 11.8 FL    NEUTROPHILS 68 40 - 73 %    LYMPHOCYTES 23 21 - 52 %    MONOCYTES 8 3 - 10 %    EOSINOPHILS 1 0 - 5 %    BASOPHILS 0 0 - 2 %    ABS. NEUTROPHILS 6.0 1.8 - 8.0 K/UL    ABS. LYMPHOCYTES 2.0 0.9 - 3.6 K/UL    ABS. MONOCYTES 0.7 0.05 - 1.2 K/UL    ABS.  EOSINOPHILS 0.1 0.0 - 0.4 K/UL ABS. BASOPHILS 0.0 0.0 - 0.1 K/UL    DF AUTOMATED     CARDIAC PANEL,(CK, CKMB & TROPONIN)    Collection Time: 05/14/20  7:43 PM   Result Value Ref Range    CK - MB 6.4 (H) <3.6 ng/ml    CK-MB Index 1.1 0.0 - 4.0 %     (H) 39 - 308 U/L    Troponin-I, QT 0.09 (H) 0.0 - 0.045 NG/ML   EKG, 12 LEAD, INITIAL    Collection Time: 05/14/20  7:52 PM   Result Value Ref Range    Ventricular Rate 72 BPM    Atrial Rate 72 BPM    P-R Interval 202 ms    QRS Duration 98 ms    Q-T Interval 414 ms    QTC Calculation (Bezet) 453 ms    Calculated P Axis 43 degrees    Calculated R Axis 40 degrees    Calculated T Axis -9 degrees    Diagnosis       Normal sinus rhythm  Possible Left atrial enlargement  Left ventricular hypertrophy with repolarization abnormality  Abnormal ECG  No previous ECGs available     DRUG SCREEN, URINE    Collection Time: 05/14/20  9:50 PM   Result Value Ref Range    BENZODIAZEPINES Negative NEG      BARBITURATES Negative NEG      THC (TH-CANNABINOL) Negative NEG      OPIATES Negative NEG      PCP(PHENCYCLIDINE) Negative NEG      COCAINE Negative NEG      AMPHETAMINES Negative NEG      METHADONE Negative NEG      HDSCOM (NOTE)    GLUCOSE, POC    Collection Time: 05/14/20 10:38 PM   Result Value Ref Range    Glucose (POC) 94 70 - 110 mg/dL   TROPONIN I    Collection Time: 05/15/20 12:00 AM   Result Value Ref Range    Troponin-I, QT 0.10 (H) 0.0 - 0.045 NG/ML   IRON PROFILE    Collection Time: 05/15/20  5:26 AM   Result Value Ref Range    Iron 45 (L) 50 - 175 ug/dL    TIBC 285 250 - 450 ug/dL    Iron % saturation 16 (L) 20 - 50 %   TROPONIN I    Collection Time: 05/15/20  5:26 AM   Result Value Ref Range    Troponin-I, QT 0.29 (H) 0.0 - 0.045 NG/ML   HEMOGLOBIN A1C WITH EAG    Collection Time: 05/15/20  5:26 AM   Result Value Ref Range    Hemoglobin A1c 6.3 (H) 4.2 - 5.6 %    Est. average glucose 134 mg/dL   LIPID PANEL    Collection Time: 05/15/20  5:26 AM   Result Value Ref Range    LIPID PROFILE Cholesterol, total 177 <200 MG/DL    Triglyceride 44 <150 MG/DL    HDL Cholesterol 63 (H) 40 - 60 MG/DL    LDL, calculated 105.2 (H) 0 - 100 MG/DL    VLDL, calculated 8.8 MG/DL    CHOL/HDL Ratio 2.8 0 - 5.0     GLUCOSE, POC    Collection Time: 05/15/20  6:05 AM   Result Value Ref Range    Glucose (POC) 110 70 - 110 mg/dL   ECHO ADULT COMPLETE    Collection Time: 05/15/20  9:31 AM   Result Value Ref Range    LA Volume 129.17 18 - 58 mL    LV E' Lateral Velocity 6.59 cm/s    LV E' Septal Velocity 3.90 cm/s    Tapse 2.06 (A) 1.5 - 2.0 cm    Ao Root D 3.29 cm    LVIDd 5.68 4.2 - 5.9 cm    LVPWd 1.41 (A) 0.6 - 1.0 cm    LVIDs 4.12 cm    IVSd 2.07 (A) 0.6 - 1.0 cm    LVOT d 2.00 cm    LVOT Peak Velocity 109.04 cm/s    LVOT Peak Gradient 4.8 mmHg    LVOT VTI 22.09 cm    MV A Gibran 42.40 cm/s    MV E Gibran 58.65 cm/s    MV E/A 1.40     LA Vol 4C 96.65 (A) 18 - 58 mL    LA Vol 2C 141.46 (A) 18 - 58 mL    LA Area 4C 28.2 cm2    LV Mass .0 (A) 88 - 224 g    LV Mass AL Index 273.0 (A) 49 - 115 g/m2    E/E' lateral 8.90     E/E' septal 15.04     E/E' ratio (averaged) 11.97     Mitral Valve E Wave Deceleration Time 189.9 ms    Triscuspid Valve Regurgitation Peak Gradient 26.3 mmHg    Aortic Regurgitant Pressure Half-time 558.4 cm    TR Max Velocity 256.51 cm/s    LA Vol Index 72.98 16 - 28 ml/m2    LA Vol Index 79.92 16 - 28 ml/m2    LA Vol Index 54.61 16 - 28 ml/m2    AR Max Gibran 489.40 cm/s    Left Ventricular Fractional Shortening by 2D 20.965186568 %    Left Ventricular Outflow Tract Mean Gradient 0.6141147110282 mmHg    Left Ventricular Outflow Tract Mean Velocity 1.82956284167644 cm/s    Mitral Valve Deceleration Barber 1.7040635801502     AV peak gradient 01.276461858 mmHg    Left Ventricular End Diastolic Volume by Teichholz Method 17.891989842 mL    Left Ventricular End Systolic Volume by Teichholz Method 97.031481201 mL    Left Ventricular Stroke Volume by Teichholz Method 08.193555871 mL

## 2020-05-15 NOTE — ROUTINE PROCESS
Bedside and Verbal shift change report given to Ad Camarena RN (oncoming nurse) by Joslyn Fontanez RN (offgoing nurse). Report included the following information SBAR, Kardex, Intake/Output, MAR and Cardiac Rhythm sinus rhythm with PVCs.

## 2020-05-15 NOTE — PROGRESS NOTES
OT order received and chart reviewed. Patient off the unit. Will continue to follow and see patient when available/as appropriate.             Thank you for this referral,   Latasha Rodriguez MS, OTR/L

## 2020-05-15 NOTE — ED NOTES
Notified Dr. Jeremias Maldonado of patient's failed dysphagia screen due to vocal quality not being clear (slurred).  Per Dr. Jeremias Maldonado, continue with screen to see how patient swallows

## 2020-05-15 NOTE — ACP (ADVANCE CARE PLANNING)
Advance Care Planning     Advance Care Planning Activator (Inpatient)  Conversation Note      Date of ACP Conversation: 5/14/2020    Conversation Conducted with:   Patient with Decision Making Capacity    ACP Activator: Cheryle Arteaga RN    *When Decision Maker makes decisions on behalf of the incapacitated patient: Decision Maker is asked to consider and make decisions based on patient values, known preferences, or best interests. Health Care Decision Maker:    Current Designated Health Care Decision Maker:   Primary Decision Maker: Chani Chica - [facility] [place] - [phone]  (If there is a [address] named in the [address] Makers\" box in the ACP activity, but it is not visible above, be sure to open that field and then select the health care decision maker relationship (ie \"primary\") in the blank space to the right of the name.) Validate  this information as still accurate & up-to-date; edit [place] field as needed.)    Note: Assess and validate information in current ACP documents, as indicated.            Length of ACP Conversation in minutes:      Conversation Outcomes:  [] ACP discussion completed  [] Existing advance directive reviewed with patient; no changes to patient's previously recorded wishes     [x] New Advance Directive completed   [] Portable Do Not Rescitate prepared for Provider review and signature  [] POLST/POST/MOLST/MOST prepared for Provider review and signature      Follow-up plan:    [] Schedule follow-up conversation to continue planning  [] Referred individual to Provider for additional questions/concerns   [] Advised patient/agent/surrogate to review completed ACP document and update if needed with changes in condition, patient preferences or care setting     [x] This note routed to one or more involved healthcare providers

## 2020-05-15 NOTE — PROGRESS NOTES
Echocardiogram completed. Patient returned to room with armband in place. Report to follow.     [address] [facility]

## 2020-05-15 NOTE — PROGRESS NOTES
Problem: Self Care Deficits Care Plan (Adult)  Goal: *Acute Goals and Plan of Care (Insert Text)  Outcome: Resolved/Met     OCCUPATIONAL THERAPY EVALUATION/DISCHARGE    Patient: Lindy Leiva (45 y.o. male)  Date: 5/15/2020  Primary Diagnosis: Acute ischemic stroke ([facility] [address] [I63.9]  Facial droop [R29.810]  Hypertensive emergency [I16.1]  Precautions: (Standard)  PLOF: Patient lives alone in a one story home, 0E and was independent with self-care and functional mobility PTA. ASSESSMENT AND RECOMMENDATIONS:  Upon entering the room, patient was seated in chair, alert, and agreeable to participate in OT evaluation with MD and  briefly present. Patient is independent with basic self-care seated and standing and modified independent with functional transfers using no AD. Recommend Supervision for functional mobility as patient with elevated BP(224/111) last checked. Patient educated on call bell use prior to getting up as patient asymptomatic. Based on the objective data described below, the patient presents with no deficits that impede pt function with ADLs, functional transfers, and functional mobility. OT to d/c from caseload at this time. Skilled occupational therapy is not indicated at this time. Discharge Recommendations: None  Further Equipment Recommendations for Discharge: N/A      SUBJECTIVE:   Patient stated I can still do my selfcare and alex been walking around in here    OBJECTIVE DATA SUMMARY:   History reviewed. No pertinent past medical history. History reviewed. No pertinent surgical history.   Barriers to Learning/Limitations: None  Compensate with: visual, verbal, tactile, kinesthetic cues/model    Home Situation:   Home Situation  Home Environment: Apartment  One/Two Story Residence: One story  Living Alone: Yes  Support Systems: Family member(s), Friends \ neighbors  Patient Expects to be Discharged to[de-identified] Apartment  Current DME Used/Available at Home: None  Tub or Shower Type: Tub/Shower combination  [x]     Right hand dominant   []     Left hand dominant    Cognitive/Behavioral Status:  Neurologic State: Alert  Orientation Level: Oriented X4  Cognition: Follows commands  Safety/Judgement: Fall prevention(elevated BP)    Skin: intact  Edema: none noted    Vision/Perceptual:    Acuity: Within Defined Limits;Able to read clock/calendar on wall without difficulty; Able to read employee name badge without difficulty         Coordination: BUE     Fine Motor Skills-Upper: Left Intact; Right Intact    Gross Motor Skills-Upper: Left Intact; Right Intact    Balance:  Sitting: Intact  Standing: Intact    Strength: BUE  Strength: Within functional limits(BUE 5/5)    Tone & Sensation: BUE  Tone: Normal  Sensation: Intact    Range of Motion: BUE  AROM: Within functional limits    Functional Mobility and Transfers for ADLs:    Transfers:  Sit to Stand: Modified independent(using arm rests of recliner)  Stand to Sit: Modified independent      ADL Assessment:  Feeding: Independent    Oral Facial Hygiene/Grooming: Independent    Bathing: Independent    Upper Body Dressing: Independent    Lower Body Dressing: Independent    Toileting: Independent    ADL Intervention:  Feeding  Feeding Assistance: Independent  Container Management: Independent  Cutting Food: Independent  Utensil Management: Independent  Food to Mouth: Independent  Drink to Mouth: Independent    Lower Body Dressing Assistance  Dressing Assistance: Independent  Pants With Elastic Waist: Independent(simulation; patient also stood on one leg approx 3 seconds)  Socks: Independent  Leg Crossed Method Used: Yes  Position Performed: Seated in chair;Standing    Cognitive Retraining  Safety/Judgement: Fall prevention(elevated BP)    Pain:  Pain level pre-treatment: 0/10   Pain level post-treatment: 0/10   Pain Intervention(s): Medication (see MAR);   Response to intervention: Nurse notified, See doc flow    Activity Tolerance:   Good      Please refer to the flowsheet for vital signs taken during this treatment. After treatment:   [x]  Patient left in no apparent distress sitting up in chair  []  Patient left in no apparent distress in bed  [x]  Call bell left within reach  [x]  Nursing notified  []  Caregiver present  []  Bed alarm activated    COMMUNICATION/EDUCATION:   [x]      Role of Occupational Therapy in the acute care setting  [x]      Home safety education was provided and the patient/caregiver indicated understanding. [x]      Patient/family have participated as able and agree with findings and recommendations. []      Patient is unable to participate in plan of care at this time. Thank you for this referral.  Santa Li, OTR/L  Time Calculation: 9 mins      Eval Complexity: History: LOW Complexity : Brief history review ; Examination: LOW Complexity : 1-3 performance deficits relating to physical, cognitive , or psychosocial skils that result in activity limitations and / or participation restrictions ;    Decision Making:LOW Complexity : No comorbidities that affect functional and no verbal or physical assistance needed to complete eval tasks

## 2020-05-15 NOTE — ED NOTES
MRI screening form completed, faxed to MRI department and placed on patient's clipboard to be scanned into chart

## 2020-05-15 NOTE — CONSULTS
[facility] - Consult Note    Consultation request by Dr. Ronnie Stanton for advice/opinion related to evaluating indeterminate troponin    Date of  Admission: 5/14/2020  7:27 PM   Primary Care Physician:  Mark, MD Christos     Assessment:     -CVA, MRI brain 5/15/2020 revealed early subacute lacunar infarct in left corona radiata to basal ganglia, chronic lacunar infarcts in the right forceps minor region of the frontal lobe and right central diamond.    -Indeterminate troponin, 0.09-0.10-0.29, related to demand ischemia from uncontrolled hypertension. No cardiac symptoms.    -Hypertension, uncontrolled, BP ~220/118 on presentation  -Renal insufficiency, Cr 1.45 on presentation  -No prior PMHx, as pt states not seen by doctor since 1970's. Plan:     Independently seen and evaluated. Agree with below. He has no symptoms consistent with angina, exertional angina. His troponin likely from hypertensive urgency and persistent poorly controlled hypertension. He did not know that he has severe hypertension and was not on medical therapy. Preliminary findings on his echocardiogram this morning showed overall normal LV function without regional wall motion normality. For the time being, I would not pursue coronary invasive evaluation. Would aggressively control his blood pressure.    -Continue Amlodipine, Metoprolol, PRN Labetalol per primary team.  Titration of hypertension medications per primary team.   -Continue Lipitor, Plavix, ASA.  -Consider renal duplex study.  -Echocardiogram completed this AM, pending review by attending.  -No further cardiac workup planned during this admission.  -Pending inpatient neurology consultation, assistance appreciated in advance. -Emphasized importance of follow-up and blood pressure control. History of Present Illness: This is a 78 y.o. male admitted for Acute ischemic stroke ([facility] [address] [I63.9]  Facial droop [R29.810]  Hypertensive emergency [I16.1]. Patient complains of:  Slurred speech, facial droop    Usha Zuniga is a 78 y.o. male with PMHx as described above, who presented to the hospital due to slurred speech and facial droop that started on Monday 5/11/2020. Pt states that he came to the hospital yesterday at the insistence of his sister. He states he exercises regularly without c/o chest pain/discomfort or shortness of breath. No PND or orthopnea. No leg swelling. No bleeding problems. No recent fever, cough, vomiting/diarrhea. Cardiac risk factors: male gender, hypertension      Review of Symptoms:  Except as stated above include:  Constitutional:  negative  Respiratory:  negative  Cardiovascular:  negative  Gastrointestinal: negative  Genitourinary:  negative  Musculoskeletal:  Negative  Neurological:  As per HPI  Dermatological:  Negative  Endocrinological: Negative  Psychological:  Negative       Past Medical History:   History reviewed. No pertinent past medical history. Social History:     Social History     Socioeconomic History    Marital status: SINGLE     Spouse name: Not on file    Number of children: Not on file    Years of education: Not on file    Highest education level: Not on file   Tobacco Use    Smoking status: Never Smoker    Smokeless tobacco: Never Used   Substance and Sexual Activity    Alcohol use: Not Currently        Family History:   History reviewed. No pertinent family history.      Medications:   No Known Allergies     Current Facility-Administered Medications   Medication Dose Route Frequency    clopidogreL (PLAVIX) tablet 75 mg  75 mg Oral DAILY    atorvastatin (LIPITOR) tablet 80 mg  80 mg Oral QHS    acetaminophen (TYLENOL) tablet 650 mg  650 mg Oral Q4H PRN    labetaloL (NORMODYNE;TRANDATE) 20 mg/4 mL (5 mg/mL) injection 5 mg  5 mg IntraVENous Q10MIN PRN    heparin (porcine) injection 5,000 Units  5,000 Units SubCUTAneous Q8H    amLODIPine (NORVASC) tablet 5 mg  5 mg Oral DAILY    [START ON 5/16/2020] aspirin delayed-release tablet 81 mg  81 mg Oral DAILY    metoprolol tartrate (LOPRESSOR) tablet 12.5 mg  12.5 mg Oral Q12H         Physical Exam:     Visit Vitals  BP (!) 212/108   Pulse 67   Temp 97.7 °F (36.5 °C)   Resp 20   Ht 5' 6\" (1.676 m)   Wt 150 lb (68 kg)   SpO2 97%   BMI 24.21 kg/m²     BP Readings from Last 3 Encounters:   05/15/20 (!) 212/108     Pulse Readings from Last 3 Encounters:   05/15/20 67     Wt Readings from Last 3 Encounters:   05/15/20 150 lb (68 kg)       General:  alert, cooperative, no distress, appears stated age  Neck:  supple  Lungs:  clear to auscultation bilaterally  Heart:  Regular rate and rhythm  Abdomen:  abdomen is soft without significant tenderness, masses, organomegaly or guarding  Extremities:  Atraumatic, no edema  Skin: Warm and dry.    Neuro: alert, oriented x3, affect appropriate, right facial droop, moves all extremities well, no involuntary movements  Psych: non focal     Data Review:     Recent Labs     05/14/20 1943   WBC 8.8   HGB 12.7*   HCT 39.2        Recent Labs     05/14/20 1943      K 3.8      CO2 28   GLU 95   BUN 25*   CREA 1.45*   CA 9.1       Results for orders placed or performed during the hospital encounter of 05/14/20   EKG, 12 LEAD, INITIAL   Result Value Ref Range    Ventricular Rate 72 BPM    Atrial Rate 72 BPM    P-R Interval 202 ms    QRS Duration 98 ms    Q-T Interval 414 ms    QTC Calculation (Bezet) 453 ms    Calculated P Axis 43 degrees    Calculated R Axis 40 degrees    Calculated T Axis -9 degrees    Diagnosis       Normal sinus rhythm  Possible Left atrial enlargement  Left ventricular hypertrophy with repolarization abnormality  Abnormal ECG  No previous ECGs available         All Cardiac Markers in the last 24 hours:    Lab Results   Component Value Date/Time     (H) 05/14/2020 07:43 PM    CKMB 6.4 (H) 05/14/2020 07:43 PM    CKND1 1.1 05/14/2020 07:43 PM    TROIQ 0.29 (H) 05/15/2020 05:26 AM TROIQ 0.10 (H) 05/15/2020 12:00 AM    TROIQ 0.09 (H) 05/14/2020 07:43 PM       Last Lipid:    Lab Results   Component Value Date/Time    Cholesterol, total 177 05/15/2020 05:26 AM    HDL Cholesterol 63 (H) 05/15/2020 05:26 AM    LDL, calculated 105.2 (H) 05/15/2020 05:26 AM    Triglyceride 44 05/15/2020 05:26 AM    CHOL/HDL Ratio 2.8 05/15/2020 05:26 AM       Signed By: Birdie Solo PA-C     May 15, 2020

## 2020-05-15 NOTE — PROGRESS NOTES
Problem: Mobility Impaired (Adult and Pediatric)  Goal: *Acute Goals and Plan of Care (Insert Text)  Outcome: Resolved/Met     PHYSICAL THERAPY EVALUATION AND DISCHARGE    Patient: Lindy Leiva (75 y.o. male)  Date: 5/15/2020  Primary Diagnosis: Acute ischemic stroke ([facility] [address] [I63.9]  Facial droop [R29.810]  Hypertensive emergency [I16.1]        Precautions:   (Standard)  WBAT  PLOF: Pt lives alone in a one story apartment with 0 JED, works out regularly, has no DME, ind PTA, has family assist for support. ASSESSMENT :  Based on the objective data described below, the patient presents up in recliner in room in NAD, agreeable to PT eval. Per chart review, pt was having elevated BP earlier in the morning so pt went from chair to sit on side of bed so BP could be taken prior to mobilizing any further. Pt is able to transfer with mod ind and amb in room x 15 ft with no AD or LOB. Pts strength, sensation, and ROM to BLE are all WFL. Once seated EOB BP taken and found to be 230/134. At this time, further PT is deferred and pt advised to lie down and rest, as continued activity could continue to elevate his BP. Overall, pt is at functional baseline at this time and does not have any needs for acute PT. Recommend discharge home when medically appropriate. Pt left in bed and encouraged to stay lying down for rest until the nurse comes back to check on him, nurse immediately notified of pts elevated BP reading. Pt otherwise asymptomatic and in NAD at conclusion of session. Will sign-off. Patient does not require further skilled intervention at this level of care. PLAN :  Recommendations and Planned Interventions:   No formal PT needs identified at this time. Discharge Recommendations: None  Further Equipment Recommendations for Discharge: N/A     SUBJECTIVE:   Patient stated I feel fine.     OBJECTIVE DATA SUMMARY:   History reviewed. No pertinent past medical history. History reviewed.  No pertinent surgical history. Barriers to Learning/Limitations: None  Compensate with: N/A  Home Situation:   Home Situation  Home Environment: Apartment  One/Two Story Residence: One story  Living Alone: Yes  Support Systems: Family member(s)  Patient Expects to be Discharged to[de-identified] Apartment  Current DME Used/Available at Home: None  Tub or Shower Type: Tub/Shower combination  Critical Behavior:  Neurologic State: Alert  Orientation Level: Oriented X4  Cognition: Follows commands  Safety/Judgement: Fall prevention(rest 2/2 elevated BP )  Psychosocial  Patient Behaviors: Calm; Cooperative  Purposeful Interaction: Yes  Pt Identified Daily Priority: Clinical issues (comment)  Caritas Process: Nurture loving kindness  Caring Interventions: Reassure; Therapeutic modalities  Reassure: Informing; Acceptance;Quiet presence  Therapeutic Modalities: Humor                 Strength:    Strength: Within functional limits       Tone & Sensation:   Tone: Normal    Sensation: Intact    Range Of Motion:  AROM: Within functional limits        Functional Mobility:  Bed Mobility:        Sit to Supine: Modified independent  Scooting: Modified independent  Transfers:  Sit to Stand: Modified independent  Stand to Sit: Modified independent       Balance:   Sitting: Intact  Standing: Intact    Ambulation/Gait Training:  Distance (ft): 15 Feet (ft)  Assistive Device: (none )  Ambulation - Level of Assistance: Supervision     Gait Description (WDL): Within defined limits  Gait Abnormalities: Decreased step clearance    Pain:  Pain level pre-treatment: 0/10   Pain level post-treatment: 0/10    Activity Tolerance:   Good    Please refer to the flowsheet for vital signs taken during this treatment.   After treatment:   []         Patient left in no apparent distress sitting up in chair  [x]         Patient left in no apparent distress in bed  [x]         Call bell left within reach  [x]         Nursing notified  []         Caregiver present  []         Bed alarm activated  []         SCDs applied    COMMUNICATION/EDUCATION:   [x]         Role of Physical Therapy in the acute care setting. [x]         Fall prevention education was provided and the patient/caregiver indicated understanding. [x]         Patient/family have participated as able in goal setting and plan of care. []         Patient/family agree to work toward stated goals and plan of care. []         Patient understands intent and goals of therapy, but is neutral about his/her participation. []         Patient is unable to participate in goal setting/plan of care: ongoing with therapy staff.  []         Other:     Thank you for this referral.  Moises Gotti   Time Calculation: 10 mins      Eval Complexity: History: MEDIUM  Complexity : 1-2 comorbidities / personal factors will impact the outcome/ POC Exam:MEDIUM Complexity : 3 Standardized tests and measures addressing body structure, function, activity limitation and / or participation in recreation  Presentation: MEDIUM Complexity : Evolving with changing characteristics  Clinical Decision Making:Medium Complexity    Overall Complexity:MEDIUM

## 2020-05-15 NOTE — PROGRESS NOTES
Pt hypertensive, being treated for acute stroke. Allowing permissive HTN, will give prn antihypertensive for DBP.

## 2020-05-15 NOTE — H&P
Date of Admission: 5/14/2020      Assessment:   Suspected CVA with right-sided facial droop and slurred speech:    - 5/14 CT the head reveals moderate burden of periventricular and deep hemispheric small vascular disease and a possible old age-indeterminate infarct   - 5/15 MRI. MRA brain: Pending  Hypertensive urgency: Blood pressures have been greater than 200 in the ED  Elevated troponin: No associated chest pain; no ST-T wave changes on EKG; on aspirin and Plavix  microcytic anemia: Suspect iron deficiency    Plan:   Stroke protocol order set: Call neurology in the morning to consult  Aspirin and Plavix as per tele-neurology recommendation  Lipitor 80 mg  PT, OT, speech evaluations  Permissive hypertension for the first 24 hours  May give labetalol if BP is greater than 220. Echocardiogram pending  MRI and MRA of the brain and neck have been ordered  Iron panel  Serial troponins    Patient is full code. Dyan Walters D.O. Internal Medicine and Infectious Diseases      Subjective:    Patient is a 78 y.o.male who is being evaluated for CVA. Mr. Lefty Gu is a pleasant 77 yo male with not prior medical history who is presenting to the ED with comaplaint of slurred speech. He states that he been feeling fine but noticed that he was having some slight change in how his speech sounded. He is but was then talking on the phone with his sister who told him that he was talking funny and that he should call EMS and go to the hospital.  Initially he had decided not to come to the emergency department but ultimately decided to come in for evaluation. He denies any headaches visual changes, chest pain, palpitations, shortness of breath, cough, recent falls, seizures, loss of bowel or bladder function, or weakness to his upper or lower extremities. He notes that he is physically active and exercises every day. He does not remember the last time he was seen by a physician. He is not aware of high blood pressure. He denies any prior surgeries. He had been taking Motrin periodically for joint pain. History reviewed. No pertinent past medical history. History reviewed. No pertinent surgical history. History reviewed. No pertinent family history. Medications reviewed as below:     No Known Allergies  Social History     Socioeconomic History    Marital status: SINGLE     Spouse name: Not on file    Number of children: Not on file    Years of education: Not on file    Highest education level: Not on file   Occupational History    Not on file   Social Needs    Financial resource strain: Not on file    Food insecurity     Worry: Not on file     Inability: Not on file    Transportation needs     Medical: Not on file     Non-medical: Not on file   Tobacco Use    Smoking status: Never Smoker    Smokeless tobacco: Never Used   Substance and Sexual Activity    Alcohol use: Not Currently    Drug use: Not on file    Sexual activity: Not on file   Lifestyle    Physical activity     Days per week: Not on file     Minutes per session: Not on file    Stress: Not on file   Relationships    Social connections     Talks on phone: Not on file     Gets together: Not on file     Attends Buddhist service: Not on file     Active member of club or organization: Not on file     Attends meetings of clubs or organizations: Not on file     Relationship status: Not on file    Intimate partner violence     Fear of current or ex partner: Not on file     Emotionally abused: Not on file     Physically abused: Not on file     Forced sexual activity: Not on file   Other Topics Concern    Not on file   Social History Narrative    Not on file        Review of Systems    Negative Unless BOLDED    General: fevers, chills, myalgias, arthralgias, unexplained weight loss, malaise, fatigue.   HEENT:  headaches,sinus pain or presure, recent URI, recent dental procedures;  tinnitus, hearing loss , visual changes, catarats, dizziness or blurred vision  PUlMONARY:  cough , shortness of breath, sputum production, hx of asthma or COPD. previous treatement for TB or PPD. Cardiovascular: chest pain, previous CAD/MI, vavlular heart disease,  murmurs  GI:   nausea, vomiting, diarrhea, abdominal pain, prior C.diff  :  urinary frequency, dysuria, hematuria, bladder incontinence. Neurologic:  seizures, syncope or prior CVA/TIA, confusion, memory impairment, neuropathy, slurred speech, facial droop  Musculoskeletal:  myalgias arthralgias, joint pain/ swelling,  back pain  Skin:  Purities,  recurrent cellulitis,  chronic stasis ulcer, diabetic foot ulcers  Endocrine: polyuria, polydipsia, hair loss, weight gain  Psych: Denies depression or treatment by a psychiatrist/psycologist  Heme-Onc: prior DVT, easy bruising, fatigue, malignancy        Objective:        Visit Vitals  /73 (BP 1 Location: Right arm, BP Patient Position: At rest)   Pulse 64   Temp 98.5 °F (36.9 °C)   Resp 18   Ht 5' 6\" (1.676 m)   Wt 68.2 kg (150 lb 6.4 oz)   SpO2 98%   BMI 24.28 kg/m²     Temp (24hrs), Av.4 °F (36.9 °C), Min:98.1 °F (36.7 °C), Max:98.6 °F (37 °C)        General:   awake alert and oriented   Skin:   no rashes or skin lesions noted on limited exam   HEENT:  Normocephalic, atraumatic, PERRL, EOMI, blue rings around pupils no scleral icterus or pallor; no conjunctival hemmohage;  nasal and oral mucous are moist and without evidence of lesions. No thrush. Dentition good. Neck supple, no bruits. Lymph Nodes:   no cervical, axillary or inguinal adenopathy   Lungs:   non-labored, bilaterally clear to aspiration- no crackles wheezes rales or rhonchi   Heart:  RRR, s1 and s2; no murmurs rubs or gallops, no edema, + pedal pulses   Abdomen:  soft, non-distended, active bowel sounds, no hepatomegaly, no splenomegaly. Appropriate surgical scars for stated surgeries.  Non-tender   Genitourinary:  deferred   Extremities:   no clubbing, cyanosis; no joint effusions or swelling; Full ROM of all large joints to the upper and lower extremities; muscle mass appropriate for age   Neurologic:  No gross focal sensory abnormalities; 5/5 muscle strength to upper and lower extremities. Speech slightly slurred; right facial droop Cranial nerves intact   Psychiatric:   appropriate and interactive. Labs: Results:   Chemistry Recent Labs     05/14/20 1943   GLU 95      K 3.8      CO2 28   BUN 25*   CREA 1.45*   CA 9.1   AGAP 5   BUCR 17      CBC w/Diff Recent Labs     05/14/20 1943   WBC 8.8   RBC 5.68*   HGB 12.7*   HCT 39.2      GRANS 68   LYMPH 23   EOS 1            No results found for: SDES No results found for: CULT       Imaging:      All imaging reviewed from Admission to present as per radiology interpretation in [facility]

## 2020-05-15 NOTE — PROGRESS NOTES
Problem: Dysphagia (Adult)  Goal: *Acute Goals and Plan of Care (Insert Text)  Description: Dysphagia Present: mild oral  Aspiration: none on eval      Recommendations:  Diet: regular/ thin  Meds: as tolerated  Basic Aspiration Precautions  Oral Care TID    Goals:  Patient will:  1. Tolerate PO trials with no overt s/s aspiration or distress in 4/5 trials  2. Perform oral-motor strengthening exercises with min cues to increase oral strength and decrease dysarthria    Outcome: Progressing Towards Goal    SPEECH LANGUAGE PATHOLOGY BEDSIDE SPEECH-LANGUAGE   AND SWALLOW EVALUATION    Patient: Lexis Mahoney (76 y.o. male)  Date: 5/15/2020  Primary Diagnosis: Acute ischemic stroke ([facility] [address] [I63.9]  Facial droop [R29.810]  Hypertensive emergency [I16.1]        Precautions: basic aspiration       PLOF: as epr H&P    ASSESSMENT :  Based on the objective data described below, the patient presents with mild oral dysphagia in setting of left corona radiata to basal ganglia CVA. Pt awake, alert, oriented x4, strong vocal quality, mild dysarthria. Pt with mild right labial weakness (upper > lower) with decreased ROM, lingual deviation to the right resulting in dysarthria as mentioned above. Educated pt to compensatory speech strategies (decrease rate, increase orality, overarticulate), as well as lingual/ labial resistance exercises to be completed several times a day to improve lingual/ labial strength and dysarthria. Pt denied odynophagia, globus sensation, or s/sx aspiration. Pt with natural dentition with several missing posterior teeth, functional palate and mandibular movement/ strength. Pt accepted regular solids with serial straw sips thin. Pt demo mildly prolonged mastication (on left side- which pt reports he does at baseline) with mild lingual spread, fairly good oral clearing following solids, timely swallow response with functional hyolaryngeal elevation and no overt s/sx aspiration.   Pt able to take single pill whole with straw sips thin without difficulty. Rec: continue regular solids with thin liquids, basic aspiration precautions. SLP will f/u 1-2 visits with diet tolerance and follow through of resistive OMEx. Patient will benefit from skilled intervention to address the above impairments. Patient's rehabilitation potential is considered to be Excellent  Factors which may influence rehabilitation potential include:   [x]            None noted  []            Mental ability/status  []            Medical condition  []            Home/family situation and support systems  []            Safety awareness  []            Pain tolerance/management  []            Other:      PLAN :  Recommendations and Planned Interventions:  continue regular solids with thin liquids, basic aspiration precautions. Frequency/Duration: Patient will be followed by speech-language pathology 1-2 times to address goals. Discharge Recommendations: Home Health, Inpatient Rehab, Outpatient, and To Be Determined     SUBJECTIVE:   Patient stated My sister said my speech didn't sound right.     OBJECTIVE:   History reviewed. No pertinent past medical history. History reviewed. No pertinent surgical history. Home Situation:   Home Situation  Home Environment: Apartment  One/Two Story Residence: One story  Living Alone: Yes  Support Systems: Family member(s), Friends \ neighbors  Patient Expects to be Discharged to[de-identified] Apartment  Current DME Used/Available at Home: None    Diet prior to admission: regular/ thin  Current Diet:  regular/ thin     Cognitive and Communication Status:  Neurologic State: Alert  Orientation Level: Oriented X4, Appropriate for age  Cognition: Appropriate for age attention/concentration, Appropriate safety awareness  Perception: Appears intact  Perseveration: No perseveration noted  Safety/Judgement: Awareness of environment, Fall prevention    Motor Speech:  Oral-Motor Structure/Motor Speech  Labial: Right droop; Decreased rate  Dentition: Natural;Limited  Oral Hygiene: wfl  Lingual: Right deviation;Decreased strength  Velum: No impairment  Mandible: No impairment     Voice:  Vocal Quality: No impairment   Oral Assessment:  Oral Assessment  Labial: Right droop; Decreased rate  Dentition: Natural;Limited  Oral Hygiene: wfl  Lingual: Right deviation;Decreased strength  Velum: No impairment  Mandible: No impairment  P.O. Trials:  Patient Position: upright in bed  Vocal quality prior to P.O.: No impairment  Consistency Presented: Thin liquid; Solid  How Presented: Self-fed/presented;Straw;Successive swallows     Bolus Acceptance: No impairment  Bolus Formation/Control: Impaired  Type of Impairment: Mastication  Propulsion: Delayed (# of seconds)  Oral Residue: None  Initiation of Swallow: No impairment  Laryngeal Elevation: Functional  Aspiration Signs/Symptoms: None  Pharyngeal Phase Characteristics: No impairment, issues, or problems   Effective Modifications: Alternate liquids/solids  Cues for Modifications: None       Oral Phase Severity: Mild  Pharyngeal Phase Severity : No impairment    PAIN:  Pain level pre-treatment: 0/10   Pain level post-treatment: 0/10   Pain Intervention(s): Medication (see MAR); Rest, Ice, Repositioning   Response to intervention: Nurse notified, See doc flow    After Evaluation:   []            Patient left in no apparent distress sitting up in chair  [x]            Patient left in no apparent distress in bed  [x]            Call bell left within reach  [x]            Nursing notified  []            Family present  []            Caregiver present  []            Bed alarm activated    COMMUNICATION/EDUCATION:   [x]            Aspiration precautions; swallow safety; compensatory techniques. [x]            Receptive/Expressive communication strategies  []            Patient/family have participated as able in goal setting and plan of care.   []            Patient/family agree to work toward stated goals and plan of care. []            Patient understands intent and goals of therapy; neutral about participation. []            Patient unable to participate in goal setting/plan of care; educ ongoing with interdisciplinary staff  []         Posted safety precautions in patient's room.       Thank you for this referral,  Tino Tilley MS, CCC/SLP  Time Calculation: 28 mins  Speech Evaluation Time: 13 minutes  Swallow Evaluation Time: 15 minutes

## 2020-05-15 NOTE — PROGRESS NOTES
Attempted to see pt for initial assessment but pt off the floor to Echo at this time. CM will continue to follow.       RYLEE AlexN RN  Care Management  Pager: [phone]

## 2020-05-15 NOTE — PROGRESS NOTES
ARU/IPR REFERRAL CONTACT NOTE  [id number] [place] [address] for Physical Rehabilitation    RE:  Henrene Kanner     Thank you for the opportunity to review this patient's case for admission to [id number] [place] [address] for Physical Rehabilitation. Based on our pre-admission screening:       Irene ] This patient does not meet criteria for admission to [facility] for Physical Rehabilitation:    Irene ] Documents do not reflect active medical necessity requiring close Physician involvement and Rehabilitation Nursing. [ Saralee Lacks Too functional, per documentation, patient does not require both Physical and Occupational Therapy Services at an acute rehabilitation level of intensity. Thank you for this referral.   Please do not hesitate to call if you need further information or have additional questions. Regards,    RAÚL Salinas PTA for Kylah Barr, DAVID  Admissions [facility] for Physical Rehabilitation  [phone]

## 2020-05-16 LAB
ANION GAP SERPL CALC-SCNC: 4 MMOL/L (ref 3–18)
ATRIAL RATE: 72 BPM
BUN SERPL-MCNC: 23 MG/DL (ref 7–18)
BUN/CREAT SERPL: 16 (ref 12–20)
CALCIUM SERPL-MCNC: 8.6 MG/DL (ref 8.5–10.1)
CALCULATED P AXIS, ECG09: 43 DEGREES
CALCULATED R AXIS, ECG10: 40 DEGREES
CALCULATED T AXIS, ECG11: -9 DEGREES
CHLORIDE SERPL-SCNC: 105 MMOL/L (ref 100–111)
CK MB CFR SERPL CALC: 0.7 % (ref 0–4)
CK MB SERPL-MCNC: 6.1 NG/ML (ref 5–25)
CK SERPL-CCNC: 898 U/L (ref 39–308)
CO2 SERPL-SCNC: 27 MMOL/L (ref 21–32)
CREAT SERPL-MCNC: 1.42 MG/DL (ref 0.6–1.3)
DIAGNOSIS, 93000: NORMAL
GLUCOSE BLD STRIP.AUTO-MCNC: 108 MG/DL (ref 70–110)
GLUCOSE BLD STRIP.AUTO-MCNC: 111 MG/DL (ref 70–110)
GLUCOSE BLD STRIP.AUTO-MCNC: 89 MG/DL (ref 70–110)
GLUCOSE BLD STRIP.AUTO-MCNC: 91 MG/DL (ref 70–110)
GLUCOSE SERPL-MCNC: 95 MG/DL (ref 74–99)
HCT VFR BLD AUTO: 38.5 % (ref 36–48)
HGB BLD-MCNC: 12.4 G/DL (ref 13–16)
P-R INTERVAL, ECG05: 202 MS
POTASSIUM SERPL-SCNC: 3.7 MMOL/L (ref 3.5–5.5)
Q-T INTERVAL, ECG07: 414 MS
QRS DURATION, ECG06: 98 MS
QTC CALCULATION (BEZET), ECG08: 453 MS
SODIUM SERPL-SCNC: 136 MMOL/L (ref 136–145)
TROPONIN I SERPL-MCNC: 0.16 NG/ML (ref 0–0.04)
VENTRICULAR RATE, ECG03: 72 BPM

## 2020-05-16 PROCEDURE — 36415 COLL VENOUS BLD VENIPUNCTURE: CPT

## 2020-05-16 PROCEDURE — 80048 BASIC METABOLIC PNL TOTAL CA: CPT

## 2020-05-16 PROCEDURE — 82550 ASSAY OF CK (CPK): CPT

## 2020-05-16 PROCEDURE — 74011250636 HC RX REV CODE- 250/636: Performed by: INTERNAL MEDICINE

## 2020-05-16 PROCEDURE — 74011250637 HC RX REV CODE- 250/637: Performed by: INTERNAL MEDICINE

## 2020-05-16 PROCEDURE — 65660000000 HC RM CCU STEPDOWN

## 2020-05-16 PROCEDURE — 85018 HEMOGLOBIN: CPT

## 2020-05-16 PROCEDURE — 82962 GLUCOSE BLOOD TEST: CPT

## 2020-05-16 RX ORDER — ASPIRIN 325 MG
325 TABLET ORAL DAILY
Status: DISCONTINUED | OUTPATIENT
Start: 2020-05-17 | End: 2020-05-17

## 2020-05-16 RX ORDER — AMLODIPINE BESYLATE 10 MG/1
10 TABLET ORAL DAILY
Status: DISCONTINUED | OUTPATIENT
Start: 2020-05-17 | End: 2020-05-18 | Stop reason: HOSPADM

## 2020-05-16 RX ORDER — HYDRALAZINE HYDROCHLORIDE 10 MG/1
10 TABLET, FILM COATED ORAL 3 TIMES DAILY
Status: DISCONTINUED | OUTPATIENT
Start: 2020-05-16 | End: 2020-05-16

## 2020-05-16 RX ORDER — METOPROLOL TARTRATE 25 MG/1
25 TABLET, FILM COATED ORAL EVERY 12 HOURS
Status: DISCONTINUED | OUTPATIENT
Start: 2020-05-16 | End: 2020-05-18 | Stop reason: HOSPADM

## 2020-05-16 RX ORDER — HYDRALAZINE HYDROCHLORIDE 20 MG/ML
10 INJECTION INTRAMUSCULAR; INTRAVENOUS
Status: DISCONTINUED | OUTPATIENT
Start: 2020-05-16 | End: 2020-05-18 | Stop reason: HOSPADM

## 2020-05-16 RX ORDER — AMLODIPINE BESYLATE 5 MG/1
5 TABLET ORAL
Status: COMPLETED | OUTPATIENT
Start: 2020-05-16 | End: 2020-05-16

## 2020-05-16 RX ORDER — HYDRALAZINE HYDROCHLORIDE 10 MG/1
10 TABLET, FILM COATED ORAL 3 TIMES DAILY
Status: DISCONTINUED | OUTPATIENT
Start: 2020-05-16 | End: 2020-05-17

## 2020-05-16 RX ORDER — HYDRALAZINE HYDROCHLORIDE 25 MG/1
25 TABLET, FILM COATED ORAL 3 TIMES DAILY
Status: DISCONTINUED | OUTPATIENT
Start: 2020-05-16 | End: 2020-05-16

## 2020-05-16 RX ADMIN — HYDRALAZINE HYDROCHLORIDE 10 MG: 20 INJECTION INTRAMUSCULAR; INTRAVENOUS at 17:02

## 2020-05-16 RX ADMIN — METOPROLOL TARTRATE 25 MG: 25 TABLET, FILM COATED ORAL at 21:05

## 2020-05-16 RX ADMIN — CLOPIDOGREL BISULFATE 75 MG: 75 TABLET ORAL at 08:43

## 2020-05-16 RX ADMIN — HYDRALAZINE HYDROCHLORIDE 10 MG: 10 TABLET, FILM COATED ORAL at 17:02

## 2020-05-16 RX ADMIN — ASPIRIN 81 MG: 81 TABLET, COATED ORAL at 08:44

## 2020-05-16 RX ADMIN — HEPARIN SODIUM 5000 UNITS: 5000 INJECTION INTRAVENOUS; SUBCUTANEOUS at 19:04

## 2020-05-16 RX ADMIN — AMLODIPINE BESYLATE 5 MG: 5 TABLET ORAL at 08:50

## 2020-05-16 RX ADMIN — LABETALOL 20 MG/4 ML (5 MG/ML) INTRAVENOUS SYRINGE 5 MG: at 08:52

## 2020-05-16 RX ADMIN — ATORVASTATIN CALCIUM 80 MG: 40 TABLET, FILM COATED ORAL at 21:05

## 2020-05-16 RX ADMIN — HYDRALAZINE HYDROCHLORIDE 10 MG: 10 TABLET, FILM COATED ORAL at 21:05

## 2020-05-16 RX ADMIN — HEPARIN SODIUM 5000 UNITS: 5000 INJECTION INTRAVENOUS; SUBCUTANEOUS at 02:09

## 2020-05-16 RX ADMIN — HEPARIN SODIUM 5000 UNITS: 5000 INJECTION INTRAVENOUS; SUBCUTANEOUS at 12:58

## 2020-05-16 RX ADMIN — AMLODIPINE BESYLATE 5 MG: 5 TABLET ORAL at 17:02

## 2020-05-16 RX ADMIN — METOPROLOL TARTRATE 12.5 MG: 25 TABLET, FILM COATED ORAL at 08:43

## 2020-05-16 NOTE — PROGRESS NOTES
[facility]  -  Progress Note      Patient: Jose Last MRN: [id number]  SSN: [id number]    YOB: 1950  Age: 78 y.o. Sex: male      Admit Date: 5/14/2020    Assessment:     -CVA, MRI brain 5/15/2020 revealed early subacute lacunar infarct in left corona radiata to basal ganglia, chronic lacunar infarcts in the right forceps minor region of the frontal lobe and right central diamond.    -Indeterminate troponin, 0.09-0.10-0.29, related to demand ischemia from uncontrolled hypertension. No cardiac symptoms.    -Hypertensive heart disease. Echo 5/15/2020: LV is dilated with moderately to severely increased wall thickness and normal systolic function with EF 65%, grade 3 diastolic dysfunction, no RWMA, severely dilated LA, mild aortic insufficiency. -Hypertension, uncontrolled, BP ~220/118 on presentation  -Renal insufficiency, Cr 1.45 on presentation  -No prior PMHx, as pt states not seen by doctor since 1970's. Plan:     -Continued on Amlodipine, Lopressor. Titration of HTN medications per primary team.  Needs aggressive BP control as long as okay from neurologic standpoint.  -Continued on ASA/Plavix, per neurology recommendations.  -No further recommendations from cardiac standpoint. Subjective:     No new complaints.       Objective:      Patient Vitals for the past 8 hrs:   Temp Pulse Resp BP SpO2   05/16/20 0400 98.2 °F (36.8 °C) 64 15 (!) 208/108 100 %   05/16/20 0000 98.3 °F (36.8 °C) 60 14 (!) 194/96 100 %         Patient Vitals for the past 96 hrs:   Weight   05/15/20 0841 150 lb (68 kg)   05/14/20 2204 150 lb 6.4 oz (68.2 kg)         Intake/Output Summary (Last 24 hours) at 5/16/2020 0742  Last data filed at 5/15/2020 1745  Gross per 24 hour   Intake 580 ml   Output --   Net 580 ml       Physical Exam:  General:  alert, cooperative, no distress, appears stated age  Neck:  supple  Lungs:  clear to auscultation bilaterally  Heart:  Regular rate and rhythm  Abdomen: abdomen is soft without significant tenderness, masses, organomegaly or guarding  Extremities:  Atraumatic, no edema     Data Review:     Labs: Results:       Chemistry Recent Labs     05/16/20 0419 05/14/20 1943   GLU 95 95    138   K 3.7 3.8    105   CO2 27 28   BUN 23* 25*   CREA 1.42* 1.45*   CA 8.6 9.1   AGAP 4 5   BUCR 16 17      CBC w/Diff Recent Labs     05/16/20 0419 05/14/20 1943   WBC  --  8.8   RBC  --  5.68*   HGB 12.4* 12.7*   HCT 38.5 39.2   PLT  --  218   GRANS  --  68   LYMPH  --  23   EOS  --  1      Cardiac Enzymes Lab Results   Component Value Date/Time     (H) 05/16/2020 04:19 AM    CKMB 6.1 (H) 05/16/2020 04:19 AM    CKND1 0.7 05/16/2020 04:19 AM    TROIQ 0.16 (H) 05/16/2020 04:19 AM    TROIQ 0.21 (H) 05/15/2020 04:33 PM    TROIQ 0.22 (H) 05/15/2020 12:42 PM      Lipid Panel Lab Results   Component Value Date/Time    Cholesterol, total 177 05/15/2020 05:26 AM    HDL Cholesterol 63 (H) 05/15/2020 05:26 AM    LDL, calculated 105.2 (H) 05/15/2020 05:26 AM    VLDL, calculated 8.8 05/15/2020 05:26 AM    Triglyceride 44 05/15/2020 05:26 AM    CHOL/HDL Ratio 2.8 05/15/2020 05:26 AM

## 2020-05-16 NOTE — ROUTINE PROCESS
Bedside and Verbal shift change report given to [employer] (oncoming nurse) by Benitez Sandoval (offgoing nurse). Report included the following information SBAR and Kardex.

## 2020-05-16 NOTE — PROGRESS NOTES
SUBJECTIVE:    Patient feels much better. Wants to go home if possible. Denies any headaches or dizziness. No chest pain or abdominal pain. Nausea vomiting. Denies for tingling or numbness. He says he has been walking without any issues. OBJECTIVE:    /86 (BP 1 Location: Left arm, BP Patient Position: Sitting)   Pulse 62   Temp 97.8 °F (36.6 °C)   Resp 20   Ht 5' 6\" (1.676 m)   Wt 68 kg (150 lb)   SpO2 98%   BMI 24.21 kg/m²     General appearance - alert, well appearing, and in no distress  Chest -clear air entry noted in bases, no wheezes  Heart - S1 and S2 normal  Abdomen - soft, nontender, nondistended, Bowel sounds present  Neurological -awake, right facial droop present, dysarthric speech, motor strength 5-5 in all 4 extremities without any sensory loss to touch. Musculoskeletal - no joint tenderness or swelling of knees bilaterally  Extremities - no pedal edema noted    ASSESSMENT:    1. Right facial droop and dysarthria due to #2, stable  2. Left coronary data to basal ganglia subacute lacunar infarct  3. Chronic microhemorrhages on MRI suggestive of hypertensive angiopathy  4.  Elevated cardiac enzymes due to demand ischemia and secondary to hypertensive heart disease, downtrending  5. Hypertension  6. Hypertensive heart disease  7. Dyslipidemia    PLAN:    Continue current management  Neurology and cardiology input noted about blood pressure control  We will increase Norvasc and metoprolol in addition to hydralazine   Possible discharge home tomorrow      Disclaimer: Sections of this note are dictated using utilizing voice recognition software, which may have resulted in some phonetic based errors in grammar and contents. Even though attempts were made to correct all the mistakes, some may have been missed, and remained in the body of the document. If questions arise, please contact our department.       Recent Results (from the past 24 hour(s))   TROPONIN I    Collection Time: 05/15/20  4:33 PM   Result Value Ref Range    Troponin-I, QT 0.21 (H) 0.0 - 0.045 NG/ML   GLUCOSE, POC    Collection Time: 05/15/20  6:06 PM   Result Value Ref Range    Glucose (POC) 130 (H) 70 - 110 mg/dL   GLUCOSE, POC    Collection Time: 05/15/20  9:36 PM   Result Value Ref Range    Glucose (POC) 102 70 - 637 mg/dL   METABOLIC PANEL, BASIC    Collection Time: 05/16/20  4:19 AM   Result Value Ref Range    Sodium 136 136 - 145 mmol/L    Potassium 3.7 3.5 - 5.5 mmol/L    Chloride 105 100 - 111 mmol/L    CO2 27 21 - 32 mmol/L    Anion gap 4 3.0 - 18 mmol/L    Glucose 95 74 - 99 mg/dL    BUN 23 (H) 7.0 - 18 MG/DL    Creatinine 1.42 (H) 0.6 - 1.3 MG/DL    BUN/Creatinine ratio 16 12 - 20      GFR est AA 60 (L) >60 ml/min/1.73m2    GFR est non-AA 49 (L) >60 ml/min/1.73m2    Calcium 8.6 8.5 - 10.1 MG/DL   CARDIAC PANEL,(CK, CKMB & TROPONIN)    Collection Time: 05/16/20  4:19 AM   Result Value Ref Range    CK - MB 6.1 (H) <3.6 ng/ml    CK-MB Index 0.7 0.0 - 4.0 %     (H) 39 - 308 U/L    Troponin-I, QT 0.16 (H) 0.0 - 0.045 NG/ML   HGB & HCT    Collection Time: 05/16/20  4:19 AM   Result Value Ref Range    HGB 12.4 (L) 13.0 - 16.0 g/dL    HCT 38.5 36.0 - 48.0 %   GLUCOSE, POC    Collection Time: 05/16/20  6:15 AM   Result Value Ref Range    Glucose (POC) 89 70 - 110 mg/dL   GLUCOSE, POC    Collection Time: 05/16/20 11:35 AM   Result Value Ref Range    Glucose (POC) 108 70 - 110 mg/dL

## 2020-05-16 NOTE — PROGRESS NOTES
Neurology Progress Note    Patient ID:  May Faust  [id number]  78 y.o.  1950    Subjective:      Patient was seen today as follow-up for right face weakness, right upper extremity mild weakness and slurred speech. The onset was about 5 days ago. The patient remained stable he is able to ambulate without difficulties. Tolerates p.o. well. Has no complaints today he has hypertension and was not aware about this. He was taking no medication for high blood pressure at home. Current Facility-Administered Medications   Medication Dose Route Frequency    [START ON 5/17/2020] amLODIPine (NORVASC) tablet 10 mg  10 mg Oral DAILY    amLODIPine (NORVASC) tablet 5 mg  5 mg Oral NOW    metoprolol tartrate (LOPRESSOR) tablet 25 mg  25 mg Oral Q12H    hydrALAZINE (APRESOLINE) 20 mg/mL injection 10 mg  10 mg IntraVENous Q4H PRN    clopidogreL (PLAVIX) tablet 75 mg  75 mg Oral DAILY    atorvastatin (LIPITOR) tablet 80 mg  80 mg Oral QHS    acetaminophen (TYLENOL) tablet 650 mg  650 mg Oral Q4H PRN    heparin (porcine) injection 5,000 Units  5,000 Units SubCUTAneous Q8H    aspirin delayed-release tablet 81 mg  81 mg Oral DAILY          Objective: Active hospital medications were reviewed    Lab results and neuroradiology studies from the last 24 hours were reviewed. Prior to Admission medications    Medication Sig Start Date End Date Taking? Authorizing Provider   ibuprofen (AdviL) 100 mg tablet Take 100 mg by mouth every six (6) hours as needed for Pain.  Indications: pain   Yes Provider, Historical     Patient Vitals for the past 8 hrs:   BP Temp Pulse Resp SpO2   05/16/20 1127 171/86 97.8 °F (36.6 °C) 62 20 98 %   05/16/20 0755 (!) 230/117 97.4 °F (36.3 °C) 67 19 100 %   [id number]/14 1901 - 05/16 0700  In: 580 [P.O.:580]  Out: -   05/14 1901 - 05/16 0700  In: 580 [P.O.:580]  Out: - RESULTRCNT(24h)Active Problems:    Facial droop (5/14/2020)      Acute ischemic stroke ([address] (5/14/2020) Hypertensive emergency (5/14/2020)        Additional comments:I reviewed the patient's new clinical lab test results. and I reviewed the patients new imaging test results. General Exam  No acute distress, mucous membranes normal color and hydration status      Neurologic Exam    Mental status:  Alert, oriented to person, place, time and circumstance  Language: normal fluency and comprehension  No visual spatial neglect or overt apraxia  Cranial nerves: PERRL, Extraocular movements intact and full, face is asymmetric to movement on the right. Tongue midline with normal strength, palat symmetric    Motor: strength 5/5 throughout  Gait: normal native gait. Assessment:     Osman Santillan is a 78 y.o. who was admitted with slurred speech and right face and arm weakness more than right lower extremity in the setting of hypertension. MRA brain concerning for bilateral ICA stenosis at the distal cervical tuberculosis junction and severe A1 portion of right WALI as well as moderate stenosis distal left M1 left MCA. There is also severe P2 stenosis left PCA. It was recommended dual antiplatelet therapy for at least 3 months.     Plan:     -Dual antiplatelet therapy aspirin 325 him Plavix 75 for 3 months, Lipitor 80 mg  -Blood pressure control with a goal 120/60 -130/80  -Patient can be discharged home with close follow-up with neurology and PCM      Signed:  Sharron Camejo MD  Adult Neurologist  5/16/2020  2:45 PM

## 2020-05-16 NOTE — ROUTINE PROCESS
Bedside and Verbal shift change report given to Adams Sampson RN (oncoming nurse) by Jason Scott (offgoing nurse). Report included the following information SBAR, Kardex and Recent Results.

## 2020-05-16 NOTE — ROUTINE PROCESS
Bedside and Verbal shift change report given to Johnnie oMsquera RN (oncoming nurse) by Godfrey Magaña RN (offgoing nurse). Report included the following information SBAR, Kardex and Recent Results.

## 2020-05-16 NOTE — ROUTINE PROCESS
Stroke Education provided to patient and the following topics were discussed    1. Patients personal risk factors for stroke are hypertension    2. Warning signs of Stroke:        * Sudden numbness or weakness of the face, arm or leg, especially on one side of          The body            * Sudden confusion, trouble speaking or understanding        * Sudden trouble seeing in one or both eyes        * Sudden trouble walking, dizziness, loss of balance or coordination        * Sudden severe headache with no known cause      3. Importance of activation Emergency Medical Services ( 9-1-1 ) immediately if experience any warning signs of stroke. 4. Be sure and schedule a follow-up appointment with your primary care doctor or any specialists as instructed. 5. You must take medicine every day to treat your risk factors for stroke. Be sure to take your medicines exactly as your doctor tells you: no more, no less. Know what your medicines are for , what they do. Anti-thrombotics /anticoagulants can help prevent strokes. You are taking the following medicine(s)  plavix     6. Smoking and second-hand smoke greatly increase your risk of stroke, cardiovascular disease and death. Smoking history cigarettes, a few per day    7. Information provided was [facility] Stroke Education Binder    8. Documentation of teaching completed in Patient Education Activity and on Care Plan with teaching response noted?   yes

## 2020-05-17 LAB
GLUCOSE BLD STRIP.AUTO-MCNC: 100 MG/DL (ref 70–110)
GLUCOSE BLD STRIP.AUTO-MCNC: 101 MG/DL (ref 70–110)
GLUCOSE BLD STRIP.AUTO-MCNC: 109 MG/DL (ref 70–110)
GLUCOSE BLD STRIP.AUTO-MCNC: 121 MG/DL (ref 70–110)

## 2020-05-17 PROCEDURE — 82962 GLUCOSE BLOOD TEST: CPT

## 2020-05-17 PROCEDURE — 74011250636 HC RX REV CODE- 250/636: Performed by: INTERNAL MEDICINE

## 2020-05-17 PROCEDURE — 65660000000 HC RM CCU STEPDOWN

## 2020-05-17 PROCEDURE — 74011250637 HC RX REV CODE- 250/637: Performed by: INTERNAL MEDICINE

## 2020-05-17 PROCEDURE — 74011250637 HC RX REV CODE- 250/637: Performed by: PSYCHIATRY & NEUROLOGY

## 2020-05-17 RX ORDER — CLONIDINE HYDROCHLORIDE 0.1 MG/1
0.1 TABLET ORAL 2 TIMES DAILY
Status: DISCONTINUED | OUTPATIENT
Start: 2020-05-17 | End: 2020-05-18 | Stop reason: HOSPADM

## 2020-05-17 RX ORDER — LANOLIN ALCOHOL/MO/W.PET/CERES
3 CREAM (GRAM) TOPICAL
Status: DISCONTINUED | OUTPATIENT
Start: 2020-05-17 | End: 2020-05-18 | Stop reason: HOSPADM

## 2020-05-17 RX ORDER — HYDRALAZINE HYDROCHLORIDE 25 MG/1
25 TABLET, FILM COATED ORAL 3 TIMES DAILY
Status: DISCONTINUED | OUTPATIENT
Start: 2020-05-17 | End: 2020-05-18 | Stop reason: HOSPADM

## 2020-05-17 RX ADMIN — METOPROLOL TARTRATE 25 MG: 25 TABLET, FILM COATED ORAL at 21:37

## 2020-05-17 RX ADMIN — ASPIRIN 325 MG ORAL TABLET 325 MG: 325 PILL ORAL at 08:26

## 2020-05-17 RX ADMIN — ATORVASTATIN CALCIUM 80 MG: 40 TABLET, FILM COATED ORAL at 21:37

## 2020-05-17 RX ADMIN — HYDRALAZINE HYDROCHLORIDE 25 MG: 25 TABLET, FILM COATED ORAL at 21:37

## 2020-05-17 RX ADMIN — HYDRALAZINE HYDROCHLORIDE 10 MG: 10 TABLET, FILM COATED ORAL at 08:26

## 2020-05-17 RX ADMIN — HEPARIN SODIUM 5000 UNITS: 5000 INJECTION INTRAVENOUS; SUBCUTANEOUS at 10:18

## 2020-05-17 RX ADMIN — MELATONIN TAB 3 MG 3 MG: 3 TAB at 21:37

## 2020-05-17 RX ADMIN — CLOPIDOGREL BISULFATE 75 MG: 75 TABLET ORAL at 08:26

## 2020-05-17 RX ADMIN — CLONIDINE HYDROCHLORIDE 0.1 MG: 0.1 TABLET ORAL at 18:37

## 2020-05-17 RX ADMIN — METOPROLOL TARTRATE 25 MG: 25 TABLET, FILM COATED ORAL at 08:26

## 2020-05-17 RX ADMIN — AMLODIPINE BESYLATE 10 MG: 10 TABLET ORAL at 08:33

## 2020-05-17 RX ADMIN — HEPARIN SODIUM 5000 UNITS: 5000 INJECTION INTRAVENOUS; SUBCUTANEOUS at 18:37

## 2020-05-17 RX ADMIN — HYDRALAZINE HYDROCHLORIDE 10 MG: 20 INJECTION INTRAMUSCULAR; INTRAVENOUS at 04:36

## 2020-05-17 RX ADMIN — CLONIDINE HYDROCHLORIDE 0.1 MG: 0.1 TABLET ORAL at 15:33

## 2020-05-17 RX ADMIN — HYDRALAZINE HYDROCHLORIDE 10 MG: 20 INJECTION INTRAMUSCULAR; INTRAVENOUS at 00:34

## 2020-05-17 RX ADMIN — HEPARIN SODIUM 5000 UNITS: 5000 INJECTION INTRAVENOUS; SUBCUTANEOUS at 03:05

## 2020-05-17 RX ADMIN — HYDRALAZINE HYDROCHLORIDE 25 MG: 25 TABLET, FILM COATED ORAL at 15:34

## 2020-05-17 NOTE — PROGRESS NOTES
SUBJECTIVE:    Patient feels much better. Denies any headaches or dizziness than dizziness when he takes blood pressure medications. No chest pain or shortness of breath or cough. No nausea vomiting or abdominal pain. No tingling or numbness. His blood pressure is 180/90 currently    OBJECTIVE:    BP (!) 179/96   Pulse 65   Temp 97.5 °F (36.4 °C)   Resp 20   Ht 5' 6\" (1.676 m)   Wt 68 kg (150 lb)   SpO2 99%   BMI 24.21 kg/m²     General appearance - alert, well appearing, and in no distress  Chest -clear air entry noted in bases, no wheezes  Heart - S1 and S2 normal  Abdomen - soft, nontender, nondistended, Bowel sounds present  Neurological -awake, right facial droop present, dysarthric speech, motor strength 5-5 in all 4 extremities without any sensory loss to touch. Extremities - no pedal edema noted    ASSESSMENT:    1. Right facial droop and dysarthria due to #2, stable  2. Left coronary data to basal ganglia subacute lacunar infarct  3. Chronic microhemorrhages on MRI suggestive of hypertensive angiopathy  4.  Elevated cardiac enzymes due to demand ischemia and secondary to hypertensive heart disease, downtrending  5. Accelerated hypertension, slowly improving  6. Hypertensive heart disease  7. Dyslipidemia    PLAN:    Continue current management  Neurology and cardiology input noted about blood pressure control  We will increase hydralazine and add clonidine  Possible discharge home tomorrow if blood pressure remains stable    Disclaimer: Sections of this note are dictated using utilizing voice recognition software, which may have resulted in some phonetic based errors in grammar and contents. Even though attempts were made to correct all the mistakes, some may have been missed, and remained in the body of the document. If questions arise, please contact our department.       Recent Results (from the past 24 hour(s))   GLUCOSE, POC    Collection Time: 05/16/20  4:15 PM   Result Value Ref Range    Glucose (POC) 111 (H) 70 - 110 mg/dL   GLUCOSE, POC    Collection Time: 05/16/20  9:15 PM   Result Value Ref Range    Glucose (POC) 91 70 - 110 mg/dL   GLUCOSE, POC    Collection Time: 05/17/20  6:07 AM   Result Value Ref Range    Glucose (POC) 100 70 - 110 mg/dL   GLUCOSE, POC    Collection Time: 05/17/20 11:58 AM   Result Value Ref Range    Glucose (POC) 101 70 - 110 mg/dL

## 2020-05-17 NOTE — PROGRESS NOTES
05/16/20 2000   Vitals   Temp 98.7 °F (37.1 °C)   Temp Source Oral   Pulse (Heart Rate) 68   Heart Rate Source Brachial   Resp Rate 19   O2 Sat (%) 100 %   Level of Consciousness Alert   BP (!) 201/96  (Dx CVA, will give scheduled BP meds)   MAP (Calculated) 131   BP 1 Location Right arm   BP 1 Method Automatic   BP Patient Position At rest   MEWS Score 3   Pain 1   Pain Scale 1 Numeric (0 - 10)   Pain Intensity 1 0   Patient Stated Pain Goal 0   Patient Observation   Observations vitals     MEWS 3 r/t elevated BP. Dx CVA. Scheduled hydralazine and metoprolol given.

## 2020-05-17 NOTE — ROUTINE PROCESS
Bedside shift change report given to [employer] (oncoming nurse) by Raghavendra Stein (offgoing nurse). Report included the following information SBAR, Kardex, Intake/Output, MAR and Recent Results.

## 2020-05-18 ENCOUNTER — HOME HEALTH ADMISSION (OUTPATIENT)
Dept: HOME HEALTH SERVICES | Facility: HOME HEALTH | Age: 70
End: 2020-05-18
Payer: COMMERCIAL

## 2020-05-18 VITALS
BODY MASS INDEX: 24.43 KG/M2 | DIASTOLIC BLOOD PRESSURE: 87 MMHG | RESPIRATION RATE: 20 BRPM | WEIGHT: 152 LBS | SYSTOLIC BLOOD PRESSURE: 154 MMHG | TEMPERATURE: 97.8 F | HEART RATE: 60 BPM | OXYGEN SATURATION: 96 % | HEIGHT: 66 IN

## 2020-05-18 LAB
GLUCOSE BLD STRIP.AUTO-MCNC: 116 MG/DL (ref 70–110)
GLUCOSE BLD STRIP.AUTO-MCNC: 96 MG/DL (ref 70–110)

## 2020-05-18 PROCEDURE — 74011250637 HC RX REV CODE- 250/637: Performed by: INTERNAL MEDICINE

## 2020-05-18 PROCEDURE — 36415 COLL VENOUS BLD VENIPUNCTURE: CPT

## 2020-05-18 PROCEDURE — 82962 GLUCOSE BLOOD TEST: CPT

## 2020-05-18 PROCEDURE — 74011250636 HC RX REV CODE- 250/636: Performed by: INTERNAL MEDICINE

## 2020-05-18 PROCEDURE — 80048 BASIC METABOLIC PNL TOTAL CA: CPT

## 2020-05-18 RX ORDER — METOPROLOL TARTRATE 25 MG/1
25 TABLET, FILM COATED ORAL EVERY 12 HOURS
Qty: 60 TAB | Refills: 0 | Status: SHIPPED | OUTPATIENT
Start: 2020-05-18 | End: 2022-09-06

## 2020-05-18 RX ORDER — HYDRALAZINE HYDROCHLORIDE 25 MG/1
25 TABLET, FILM COATED ORAL 3 TIMES DAILY
Qty: 90 TAB | Refills: 0 | Status: SHIPPED | OUTPATIENT
Start: 2020-05-18 | End: 2022-09-06

## 2020-05-18 RX ORDER — AMLODIPINE BESYLATE 10 MG/1
10 TABLET ORAL DAILY
Qty: 30 TAB | Refills: 0 | Status: SHIPPED | OUTPATIENT
Start: 2020-05-19

## 2020-05-18 RX ORDER — ASPIRIN 81 MG/1
81 TABLET ORAL DAILY
Qty: 30 TAB | Refills: 0 | Status: SHIPPED | OUTPATIENT
Start: 2020-05-19

## 2020-05-18 RX ORDER — ATORVASTATIN CALCIUM 80 MG/1
80 TABLET, FILM COATED ORAL
Qty: 30 TAB | Refills: 0 | Status: SHIPPED | OUTPATIENT
Start: 2020-05-18 | End: 2022-09-06

## 2020-05-18 RX ORDER — CLONIDINE HYDROCHLORIDE 0.1 MG/1
0.1 TABLET ORAL 2 TIMES DAILY
Qty: 60 TAB | Refills: 0 | Status: SHIPPED | OUTPATIENT
Start: 2020-05-18 | End: 2022-09-06

## 2020-05-18 RX ORDER — CLOPIDOGREL BISULFATE 75 MG/1
75 TABLET ORAL DAILY
Qty: 30 TAB | Refills: 0 | Status: SHIPPED | OUTPATIENT
Start: 2020-05-19 | End: 2022-09-06

## 2020-05-18 RX ADMIN — HYDRALAZINE HYDROCHLORIDE 10 MG: 20 INJECTION INTRAMUSCULAR; INTRAVENOUS at 04:31

## 2020-05-18 RX ADMIN — HEPARIN SODIUM 5000 UNITS: 5000 INJECTION INTRAVENOUS; SUBCUTANEOUS at 04:32

## 2020-05-18 RX ADMIN — HEPARIN SODIUM 5000 UNITS: 5000 INJECTION INTRAVENOUS; SUBCUTANEOUS at 13:21

## 2020-05-18 RX ADMIN — METOPROLOL TARTRATE 25 MG: 25 TABLET, FILM COATED ORAL at 08:48

## 2020-05-18 RX ADMIN — AMLODIPINE BESYLATE 10 MG: 10 TABLET ORAL at 08:48

## 2020-05-18 RX ADMIN — CLOPIDOGREL BISULFATE 75 MG: 75 TABLET ORAL at 08:49

## 2020-05-18 RX ADMIN — HYDRALAZINE HYDROCHLORIDE 10 MG: 20 INJECTION INTRAMUSCULAR; INTRAVENOUS at 08:49

## 2020-05-18 RX ADMIN — ASPIRIN 81 MG: 81 TABLET, COATED ORAL at 08:48

## 2020-05-18 RX ADMIN — HYDRALAZINE HYDROCHLORIDE 25 MG: 25 TABLET, FILM COATED ORAL at 08:48

## 2020-05-18 RX ADMIN — CLONIDINE HYDROCHLORIDE 0.1 MG: 0.1 TABLET ORAL at 08:49

## 2020-05-18 NOTE — DISCHARGE INSTRUCTIONS
DISCHARGE SUMMARY from Nurse    PATIENT INSTRUCTIONS:    After general anesthesia or intravenous sedation, for 24 hours or while taking prescription Narcotics:  · Limit your activities  · Do not drive and operate hazardous machinery  · Do not make important personal or business decisions  · Do  not drink alcoholic beverages  · If you have not urinated within 8 hours after discharge, please contact your surgeon on call. Report the following to your surgeon:  · Excessive pain, swelling, redness or odor of or around the surgical area  · Temperature over 100.5  · Nausea and vomiting lasting longer than 4 hours or if unable to take medications  · Any signs of decreased circulation or nerve impairment to extremity: change in color, persistent  numbness, tingling, coldness or increase pain  · Any questions    What to do at Home:  Recommended activity: Activity as tolerated,     If you experience any of the following symptoms :    ? Sudden numbness, tingling, weakness, or loss of movement in your face, arm, or leg, especially on only one side of your body. ? Sudden vision changes. ? Sudden trouble speaking. ? Sudden confusion or trouble understanding simple statements. Please follow up with Primary Care Provider or Call 911. *  Please give a list of your current medications to your Primary Care Provider. *  Please update this list whenever your medications are discontinued, doses are      changed, or new medications (including over-the-counter products) are added. *  Please carry medication information at all times in case of emergency situations. These are general instructions for a healthy lifestyle:    No smoking/ No tobacco products/ Avoid exposure to second hand smoke  Surgeon General's Warning:  Quitting smoking now greatly reduces serious risk to your health.     Obesity, smoking, and sedentary lifestyle greatly increases your risk for illness    A healthy diet, regular physical exercise & weight monitoring are important for maintaining a healthy lifestyle    You may be retaining fluid if you have a history of heart failure or if you experience any of the following symptoms:  Weight gain of 3 pounds or more overnight or 5 pounds in a week, increased swelling in our hands or feet or shortness of breath while lying flat in bed. Please call your doctor as soon as you notice any of these symptoms; do not wait until your next office visit. The discharge information has been reviewed with the patient. The patient verbalized understanding. Discharge medications reviewed with the patient and appropriate educational materials and side effects teaching were provided. ___________________________________________________________________________________________________________________________________        Patient Education        Stroke: Care Instructions  Your Care Instructions    You have had a stroke. This means that the blood flow to a part of your brain was blocked for some time, which damages the nerve cells in that part of the brain. The part of your body controlled by that part of your brain may not function properly now. The brain is an amazing organ that can heal itself to some degree. The stroke you had damaged part of your brain. But other parts of your brain may take over in some way for the damaged areas. You have already started this process. Your doctor will talk with you about what you can do to prevent another stroke. High blood pressure, high cholesterol, and diabetes are all risk factors for stroke. If you have any of these conditions, work with your doctor to make sure they are under control. Other risk factors for stroke include being overweight, smoking, and not getting regular exercise. Going home may be hard for you and your family. The more you can try to do for yourself, the better. Remember to take each day one at a time.   Follow-up care is a key part of your treatment and safety. Be sure to make and go to all appointments, and call your doctor if you are having problems. It's also a good idea to know your test results and keep a list of the medicines you take. How can you care for yourself at home?    · Enter a stroke rehabilitation (rehab) program, if your doctor recommends it. Physical, speech, and occupational therapies can help you manage bathing, dressing, eating, and other basics of daily living.     · Do not drive until your doctor says it is okay.     · It is normal to feel sad or depressed after a stroke. If these feelings last, talk to your doctor.     · If you are having problems with urine leakage, go to the bathroom at regular times, including when you first wake up and at bedtime. Also, limit fluids after dinner.     · If you are constipated, drink plenty of fluids, enough so that your urine is light yellow or clear like water. If you have kidney, heart, or liver disease and have to limit fluids, talk with your doctor before you increase the amount of fluids you drink. Set up a regular time for using the toilet. If you continue to have constipation, your doctor may suggest using a bulking agent, such as Metamucil, or a stool softener, laxative, or enema. Medicines    · Take your medicines exactly as prescribed. Call your doctor if you think you are having a problem with your medicine. You may be taking several medicines. ACE (angiotensin-converting enzyme) inhibitors, angiotensin II receptor blockers (ARBs), beta-blockers, diuretics (water pills), and calcium channel blockers control your blood pressure. Statins help lower cholesterol. Your doctor may also prescribe medicines for depression, pain, sleep problems, anxiety, or agitation.     · If your doctor has given you a blood thinner to prevent another stroke, be sure you get instructions about how to take your medicine safely.  Blood thinners can cause serious bleeding problems.     · Do not take any over-the-counter medicines or herbal products without talking to your doctor first.     · If you take birth control pills or hormone therapy, talk to your doctor about whether they are right for you.    For family members and caregivers    · Make the home safe. Set up a room so that your loved one does not have to climb stairs. Be sure the bathroom is on the same floor. Move throw rugs and furniture that could cause falls. Make sure that the lighting is good. Put grab bars and seats in tubs and showers.     · Find out what your loved one can do and what he or she needs help with. Try not to do things for your loved one that your loved one can do on his or her own. Help him or her learn and practice new skills.     · Visit and talk with your loved one often. Try doing activities together that you both enjoy, such as playing cards or board games. Keep in touch with your loved one's friends as much as you can. Encourage them to visit.     · Take care of yourself. Do not try to do everything yourself. Ask other family members to help. Eat well, get enough rest, and take time to do things that you enjoy. Keep up with your own doctor visits, and make sure to take your medicines regularly. Get out of the house as much as you can. Join a local support group. Find out if you qualify for home health care visits to help with rehab or for adult day care. When should you call for help? Call 911 anytime you think you may need emergency care. For example, call if:    · You have signs of another stroke. These may include:  ? Sudden numbness, tingling, weakness, or loss of movement in your face, arm, or leg, especially on only one side of your body. ? Sudden vision changes. ? Sudden trouble speaking. ? Sudden confusion or trouble understanding simple statements. ? Sudden problems with walking or balance. ? A sudden, severe headache that is different from past headaches.   Call  911 even if these symptoms go away in a few minutes.    Call your doctor now or seek immediate medical care if:    · You have new symptoms that may be related to your stroke, such as falls or trouble swallowing.    Watch closely for changes in your health, and be sure to contact your doctor if you have any problems. Where can you learn more? Go to http://rio-devi.info/  Enter C294 in the search box to learn more about \"Stroke: Care Instructions. \"  Current as of: July 14, 2019Content Version: 12.4  © 3927-4327 [employer], [employer]. Care instructions adapted under license by [employer] (which disclaims liability or warranty for this information). If you have questions about a medical condition or this instruction, always ask your healthcare professional. [address] any warranty or liability for your use of this information.

## 2020-05-18 NOTE — PROGRESS NOTES
SUBJECTIVE:    Patient feels much better. Walking in room without any issues. No chest or abdominal pain. No SOB or cough. No headaches or dizziness. No tingling or numbness. OBJECTIVE:    /86 (BP Patient Position: Supine)   Pulse 60   Temp 97.9 °F (36.6 °C)   Resp 19   Ht 5' 6\" (1.676 m)   Wt 68.9 kg (152 lb)   SpO2 99%   BMI 24.53 kg/m²     General appearance - alert, well appearing, and in no distress  Chest -clear air entry noted in bases, no wheezes  Heart - S1 and S2 normal  Abdomen - soft, nontender, nondistended, Bowel sounds present  Neurological -awake, right facial droop present, dysarthric speech, motor strength 5-5 in all 4 extremities without any sensory loss to touch. Extremities - no pedal edema noted    ASSESSMENT:    1. Right facial droop and dysarthria due to #2, stable  2. Left coronary data to basal ganglia subacute lacunar infarct  3. Chronic microhemorrhages on MRI suggestive of hypertensive angiopathy  4.  Elevated cardiac enzymes due to demand ischemia and secondary to hypertensive heart disease, downtrending  5. Accelerated hypertension, slowly improving  6. Hypertensive heart disease  7. Dyslipidemia    PLAN:    Continue current management  Discharge patient home today     Disclaimer: Sections of this note are dictated using utilizing voice recognition software, which may have resulted in some phonetic based errors in grammar and contents. Even though attempts were made to correct all the mistakes, some may have been missed, and remained in the body of the document. If questions arise, please contact our department.       Recent Results (from the past 24 hour(s))   GLUCOSE, POC    Collection Time: 05/17/20 11:58 AM   Result Value Ref Range    Glucose (POC) 101 70 - 110 mg/dL   GLUCOSE, POC    Collection Time: 05/17/20  5:02 PM   Result Value Ref Range    Glucose (POC) 109 70 - 110 mg/dL   GLUCOSE, POC    Collection Time: 05/17/20  9:06 PM   Result Value Ref Range    Glucose (POC) 121 (H) 70 - 110 mg/dL   GLUCOSE, POC    Collection Time: 05/18/20  6:09 AM   Result Value Ref Range    Glucose (POC) 96 70 - 110 mg/dL

## 2020-05-18 NOTE — PROGRESS NOTES
Discharged and home orders noted. Pt agreeable to [facility]. Orders sent to [address] and Virgie Hope was notified. Pt stated his cousin will be picking him up. Discharge order noted for today. Pt has been accepted to [facility] agency. Met with patient and are agreeable to the transition plan today. Transport has been arranged through pt's cousin. Patient's discharge summary and home health  orders have been forwarded to [address] [place] [address] home health  agency. Updated bedside RN, Bekah Menjivar,  to the transition plan.   Discharge information has been documented on the AVS.         WIN Kendrick RN  Care Management  Pager: [phone]

## 2020-05-18 NOTE — DISCHARGE SUMMARY
Physician Discharge Summary       Patient: Evin Traylor MRN: [id number]  SSN: [id number]    YOB: 1950  Age: 78 y.o. Sex: male    PCP: Babs Gomez MD    Allergies: Patient has no known allergies. Admit date: 5/14/2020  Admitting Provider: Keyonna Sutherland MD.  Patient was admitted to hospitalist service as he has never seen his PCP until now but will be seeing his PCP on May 20, 2020. Discharge date: 5/18/2020  Discharging Provider: Willie Brewer MD    * Admission Diagnoses: Acute ischemic stroke [facility]) [I63.9]  Facial droop [R29.810]  Hypertensive emergency [I16.1]    * Discharge Diagnoses:      1. Right facial droop and dysarthria due to #2, stable  2. Left coronary data to basal ganglia subacute lacunar infarct  3. Chronic microhemorrhages on MRI suggestive of hypertensive angiopathy  4.  Elevated cardiac enzymes due to demand ischemia and secondary to hypertensive heart disease, downtrending  5. Accelerated hypertension, slowly improving  6. Hypertensive heart disease  7. Dyslipidemia    * Hospital Course: The patient was admitted to the hospital on May 14, 2020 with complaint of right facial droop and dysarthria. Please refer hospital admission H&P for further detail. Patient was supposed to see his new primary care physician on May 20, 2020 for his hypertension management. CT head was done in the emergency room showed no intracranial hemorrhage other than moderate burden of periventricular and deep hemispheric small vessel changes. Patient was admitted here for stroke work-up after telemetry neurology evaluated this patient and deemed he is not a candidate for TPA. MRI of the brain was done showed early subacute lacunar infarct involving the left corona radiata basal ganglia and chronic lacunar infarct in the right forceps minor region of the frontal lobe and right central diamond.   MRI brain without contrast showed no evidence of proximal arterial occlusion and revealed Multifocal stenoses. [Bilateral ICA stenoses at the distal cervical to petrous junctions are very likely exaggerated by motion artifact. Nonflow limiting but moderate to severe appearing stenosis of the RACA A1 segment. Moderate stenosis at the distal M1 LMCA. Severe P2 LPCA stenosis]. MRA neck without contrast showing Limited noncontrast evaluation however patent extracranial cerebral arteries without evidence of a flow-limiting stenosis. Patient was seen by neurology service recommended aspirin Plavix for 90 days followed by aspirin only. Patient was also seen by cardiology service as patient had elevated cardiac enzymes that was thought secondary to demand ischemia secondary to hypertensive heart disease. Subsequent troponins were downtrending. Cardiology did not recommend any further work-up as echocardiogram showed ejection fraction of 55 percentage, grade 3 diastolic dysfunction consistent with hypertensive heart disease. Patient was started on following medications for blood pressure control and will need close follow-up with primary care physician for tight blood Pressure control in order to prevent further stroke. Patient will be continued on Lipitor for dyslipidemia. Patient will be discharged home today with the following medications. * Procedures: none  * No surgery found *      Consults: Cardiology and Neurology    Significant Diagnostic Studies: MRI/MRA brain, CT head, MRA neck, ECHO    Discharge Exam:    PLEASE REFER MY PROGRESS NOTE FROM 5/18/2020 for further detail      * Discharge Condition: improved  * Disposition: Home    Discharge Medications:  Current Discharge Medication List      START taking these medications    Details   amLODIPine (NORVASC) 10 mg tablet Take 1 Tab by mouth daily. Qty: 30 Tab, Refills: 0      aspirin delayed-release 81 mg tablet Take 1 Tab by mouth daily. Qty: 30 Tab, Refills: 0      atorvastatin (LIPITOR) 80 mg tablet Take 1 Tab by mouth nightly.   Qty: 30 Tab, Refills: 0      cloNIDine HCL (CATAPRES) 0.1 mg tablet Take 1 Tab by mouth two (2) times a day. Qty: 60 Tab, Refills: 0      clopidogreL (PLAVIX) 75 mg tab Take 1 Tab by mouth daily. Qty: 30 Tab, Refills: 0      hydrALAZINE (APRESOLINE) 25 mg tablet Take 1 Tab by mouth three (3) times daily. Qty: 90 Tab, Refills: 0      metoprolol tartrate (LOPRESSOR) 25 mg tablet Take 1 Tab by mouth every twelve (12) hours. Qty: 60 Tab, Refills: 0         STOP taking these medications       ibuprofen (AdviL) 100 mg tablet Comments:   Reason for Stopping:               * Follow-up Care/Patient Instructions: Activity: Activity as tolerated  Diet: Cardiac Diet  Wound Care: None needed    Follow-up Information     Follow up With Specialties Details Why Contact Info    Chente Winters MD [facility] On 5/20/2020  [address] [place] [address]  [phone]          Follow-up Appointments   Procedures    FOLLOW UP VISIT Appointment in: Other ([address] [place] [address]) 1. With PCP in 5 days 2. With dr. Prashant Chamorro and group in 2-3 weeks for stroke 3. With dr. Negro Whitaker in 3-4 weeks for elevated troponin and hypertensive heart disease 4. With dr. Wilver Lew, nephrology, i...     1. With PCP in 5 days  2. With dr. Prashant Chamorro and group in 2-3 weeks for stroke  3. With dr. Negro Whitaker in 3-4 weeks for elevated troponin and hypertensive heart disease  4. With dr. Wilver Lew, nephrology, in 2 weeks for CKD     Standing Status:   Standing     Number of Occurrences:   1     Order Specific Question:   Appointment in     Answer: Other (Specify)     Disclaimer: Sections of this note are dictated using utilizing voice recognition software, which may have resulted in some phonetic based errors in grammar and contents. Even though attempts were made to correct all the mistakes, some may have been missed, and remained in the body of the document. If questions arise, please contact our department.     Total time of discharge is greater than 35 minutes     Signed:  Josie Gonzales MD  5/18/2020  10:03 AM

## 2020-05-18 NOTE — PROGRESS NOTES
1333-Pt provided d/c instructions. Opportunity for questions and answers provided. PIV removed/shredded.

## 2020-05-18 NOTE — ROUTINE PROCESS
Bedside shift change report given to [employer] (oncoming nurse) by Lloyd Rose (offgoing nurse). Report included the following information SBAR, Kardex, Intake/Output, MAR and Recent Results.

## 2020-05-18 NOTE — DISCHARGE SUMMARY
[address]  Face to Face Encounter    Patients Name: Kim Mosher    YOB: 1950    Primary Diagnosis:     1. Right facial droop and dysarthria due to #2, stable  2. Left coronary data to basal ganglia subacute lacunar infarct  3. Chronic microhemorrhages on MRI suggestive of hypertensive angiopathy  4.  Elevated cardiac enzymes due to demand ischemia and secondary to hypertensive heart disease, downtrending  5. Accelerated hypertension, slowly improving  6. Hypertensive heart disease  7. Dyslipidemia    Date of Face to Face:   5/18/2020                                    Face to Face Encounter findings are related to primary reason for home care:   no    1. I certify that the patient needs intermittent skilled nursing care, physical therapy and/or speech therapy. I will not be following this patient in the Community and Dr. Silvestre Qureshi MD  will be responsible for signing the [address] of Care. 2. Initial Orders for Care: Must be completed only if Face to Face MD will not be signing the [address] [facility] [facility] [address] Skilled Nursing and Speech Language Pathology     3. I certify that this patient is homebound for the following reason(s): Requires considerable and taxing effort to leave the home  and Requires the assistance of 1 or more persons to leave the home     4. I certify that this patient is under my care and that I, or a nurse practitioner or [facility] working with me, had a Face-to-Face Encounter that meets the physician Face-to-Face Encounter requirements. Document the physical findings from the Face-to-Face Encounter that support the need for skilled services: Has new medications that requires skilled nursing teaching and monitoring for understanding and compliance  and Has new finding of weakness and altered mobility that requires skilled physical/occupational and/or speech therapy services for evaluation and interventions.      Wero Low, MD  5/18/2020

## 2020-05-18 NOTE — HOME CARE
Received  referral, Discharge order noted for today, [facility] will follow for SN and ST, patient lives alone,but spoke to patient's cousin Nia Mane) who states he lives close to patient and will be helping him out as needed,he also states that patient's sister Javier Lynch) will be coming from out of town to come stay with patient for a little while after he discharges; Travel screening completed for [facility] referral; [facility] referral processed to [facility] central intake. SONAM SOMMER.

## 2020-05-18 NOTE — PROGRESS NOTES
[facility] - Progress Note  Admit Date: 5/14/2020    Assessment:     -CVA, MRI brain 5/15/2020 revealed early subacute lacunar infarct in left corona radiata to basal ganglia, chronic lacunar infarcts in the right forceps minor region of the frontal lobe and right central diamond.    -Indeterminate troponin, 0.09-0.10-0.29, related to demand ischemia from uncontrolled hypertension.  No cardiac symptoms.    -Hypertensive heart disease. Echo 5/15/2020: LV is dilated with moderately to severely increased wall thickness and normal systolic function with EF 93%, grade 3 diastolic dysfunction, no RWMA, severely dilated LA, mild aortic insufficiency. -Hypertension, uncontrolled, BP ~220/118 on presentation  -Renal insufficiency, Cr 1.45 on presentation  -No prior PMHx, as pt states not seen by doctor since 1970's. Plan:     No plans for further cardiac workup. Continue BP control uptitration of meds. Dispo per primary team, please call if questions. Followup Dr. Ula Gaucher at discharge. Subjective:     No new complaints.      Objective:      Patient Vitals for the past 8 hrs:   Temp Pulse Resp BP SpO2   05/18/20 0800 97.9 °F (36.6 °C) 60 19 167/86 99 %   05/18/20 0400 98.3 °F (36.8 °C) 70 19 (!) 163/99 99 %         Patient Vitals for the past 96 hrs:   Weight   05/18/20 0400 68.9 kg (152 lb)   05/17/20 0400 68 kg (150 lb)   05/15/20 0841 68 kg (150 lb)   05/14/20 2204 68.2 kg (150 lb 6.4 oz)                    Intake/Output Summary (Last 24 hours) at 5/18/2020 1011  Last data filed at 5/17/2020 1339  Gross per 24 hour   Intake 240 ml   Output --   Net 240 ml       Physical Exam:  General:  alert, cooperative, no distress, appears stated age  Neck:  nontender  Lungs:  clear to auscultation bilaterally  Heart:  regular rate and rhythm, S1, S2 normal, no murmur, click, rub or gallop  Abdomen:  abdomen is soft without significant tenderness, masses, organomegaly or guarding  Extremities:  extremities normal, atraumatic, no cyanosis or edema    Data Review:     Labs: Results:       Chemistry Recent Labs     05/16/20 0419   GLU 95      K 3.7      CO2 27   BUN 23*   CREA 1.42*   CA 8.6   AGAP 4   BUCR 16      CBC w/Diff Recent Labs     05/16/20 0419   HGB 12.4*   HCT 38.5      Cardiac Enzymes No results found for: CPK, CK, CKMMB, CKMB, RCK3, CKMBT, CKNDX, CKND1, SAMINA, TROPT, TROIQ, CAMERON, TROPT, TNIPOC, BNP, BNPP   Coagulation No results for input(s): PTP, INR, APTT, INREXT in the last 72 hours. Lipid Panel Lab Results   Component Value Date/Time    Cholesterol, total 177 05/15/2020 05:26 AM    HDL Cholesterol 63 (H) 05/15/2020 05:26 AM    LDL, calculated 105.2 (H) 05/15/2020 05:26 AM    VLDL, calculated 8.8 05/15/2020 05:26 AM    Triglyceride 44 05/15/2020 05:26 AM    CHOL/HDL Ratio 2.8 05/15/2020 05:26 AM      BNP No results found for: BNP, BNPP, XBNPT   Liver Enzymes No results for input(s): TP, ALB, TBIL, AP, SGOT, GPT in the last 72 hours.     No lab exists for component: DBIL   Digoxin    Thyroid Studies No results found for: T4, T3U, TSH, TSHEXT       Signed By: Ale Khan MD     May 18, 2020

## 2020-05-19 ENCOUNTER — HOME CARE VISIT (OUTPATIENT)
Dept: SCHEDULING | Facility: HOME HEALTH | Age: 70
End: 2020-05-19
Payer: COMMERCIAL

## 2020-05-19 LAB
ANION GAP SERPL CALC-SCNC: 5 MMOL/L (ref 3–18)
BUN SERPL-MCNC: 22 MG/DL (ref 7–18)
BUN/CREAT SERPL: 16 (ref 12–20)
CALCIUM SERPL-MCNC: 8.2 MG/DL (ref 8.5–10.1)
CHLORIDE SERPL-SCNC: 105 MMOL/L (ref 100–111)
CO2 SERPL-SCNC: 25 MMOL/L (ref 21–32)
CREAT SERPL-MCNC: 1.4 MG/DL (ref 0.6–1.3)
GLUCOSE SERPL-MCNC: 96 MG/DL (ref 74–99)
POTASSIUM SERPL-SCNC: 4.3 MMOL/L (ref 3.5–5.5)
SODIUM SERPL-SCNC: 135 MMOL/L (ref 136–145)

## 2020-05-19 PROCEDURE — G0299 HHS/HOSPICE OF RN EA 15 MIN: HCPCS

## 2020-05-19 PROCEDURE — 3331090002 HH PPS REVENUE DEBIT

## 2020-05-19 PROCEDURE — 400013 HH SOC

## 2020-05-19 PROCEDURE — 3331090001 HH PPS REVENUE CREDIT

## 2020-05-20 ENCOUNTER — HOME CARE VISIT (OUTPATIENT)
Dept: HOME HEALTH SERVICES | Facility: HOME HEALTH | Age: 70
End: 2020-05-20
Payer: COMMERCIAL

## 2020-05-20 VITALS
HEART RATE: 66 BPM | SYSTOLIC BLOOD PRESSURE: 160 MMHG | TEMPERATURE: 98 F | OXYGEN SATURATION: 97 % | RESPIRATION RATE: 20 BRPM | DIASTOLIC BLOOD PRESSURE: 100 MMHG

## 2020-05-20 PROCEDURE — 3331090001 HH PPS REVENUE CREDIT

## 2020-05-20 PROCEDURE — 3331090002 HH PPS REVENUE DEBIT

## 2020-05-21 PROCEDURE — 3331090001 HH PPS REVENUE CREDIT

## 2020-05-21 PROCEDURE — 3331090002 HH PPS REVENUE DEBIT

## 2020-05-22 PROCEDURE — 3331090001 HH PPS REVENUE CREDIT

## 2020-05-22 PROCEDURE — 3331090002 HH PPS REVENUE DEBIT

## 2020-05-23 PROCEDURE — 3331090001 HH PPS REVENUE CREDIT

## 2020-05-23 PROCEDURE — 3331090002 HH PPS REVENUE DEBIT

## 2020-05-24 PROCEDURE — 3331090001 HH PPS REVENUE CREDIT

## 2020-05-24 PROCEDURE — 3331090002 HH PPS REVENUE DEBIT

## 2020-05-25 PROCEDURE — 3331090001 HH PPS REVENUE CREDIT

## 2020-05-25 PROCEDURE — 3331090002 HH PPS REVENUE DEBIT

## 2020-05-26 PROCEDURE — 3331090001 HH PPS REVENUE CREDIT

## 2020-05-26 PROCEDURE — 3331090002 HH PPS REVENUE DEBIT

## 2020-05-27 ENCOUNTER — HOME CARE VISIT (OUTPATIENT)
Dept: SCHEDULING | Facility: HOME HEALTH | Age: 70
End: 2020-05-27
Payer: COMMERCIAL

## 2020-05-27 ENCOUNTER — HOME CARE VISIT (OUTPATIENT)
Dept: HOME HEALTH SERVICES | Facility: HOME HEALTH | Age: 70
End: 2020-05-27
Payer: COMMERCIAL

## 2020-05-27 PROCEDURE — 3331090002 HH PPS REVENUE DEBIT

## 2020-05-27 PROCEDURE — 3331090001 HH PPS REVENUE CREDIT

## 2020-05-28 ENCOUNTER — HOME CARE VISIT (OUTPATIENT)
Dept: HOME HEALTH SERVICES | Facility: HOME HEALTH | Age: 70
End: 2020-05-28
Payer: COMMERCIAL

## 2020-05-28 ENCOUNTER — HOME CARE VISIT (OUTPATIENT)
Dept: SCHEDULING | Facility: HOME HEALTH | Age: 70
End: 2020-05-28
Payer: COMMERCIAL

## 2020-05-28 VITALS
RESPIRATION RATE: 20 BRPM | HEART RATE: 66 BPM | OXYGEN SATURATION: 99 % | TEMPERATURE: 98.8 F | DIASTOLIC BLOOD PRESSURE: 91 MMHG | SYSTOLIC BLOOD PRESSURE: 165 MMHG

## 2020-05-28 PROCEDURE — G0153 HHCP-SVS OF S/L PATH,EA 15MN: HCPCS

## 2020-05-28 PROCEDURE — 3331090001 HH PPS REVENUE CREDIT

## 2020-05-28 PROCEDURE — 3331090002 HH PPS REVENUE DEBIT

## 2020-05-28 NOTE — PROGRESS NOTES
Contacted patient at all phone numbers available, left message without return call back. SLP will call again tomorrow to attempt visit for another time.

## 2020-05-29 PROCEDURE — 3331090001 HH PPS REVENUE CREDIT

## 2020-05-29 PROCEDURE — 3331090002 HH PPS REVENUE DEBIT

## 2020-05-30 ENCOUNTER — HOME CARE VISIT (OUTPATIENT)
Dept: SCHEDULING | Facility: HOME HEALTH | Age: 70
End: 2020-05-30
Payer: COMMERCIAL

## 2020-05-30 PROCEDURE — 3331090002 HH PPS REVENUE DEBIT

## 2020-05-30 PROCEDURE — 3331090001 HH PPS REVENUE CREDIT

## 2020-05-31 PROCEDURE — 3331090001 HH PPS REVENUE CREDIT

## 2020-05-31 PROCEDURE — 3331090002 HH PPS REVENUE DEBIT

## 2020-06-01 PROCEDURE — 3331090001 HH PPS REVENUE CREDIT

## 2020-06-01 PROCEDURE — 3331090002 HH PPS REVENUE DEBIT

## 2020-06-02 ENCOUNTER — HOME CARE VISIT (OUTPATIENT)
Dept: SCHEDULING | Facility: HOME HEALTH | Age: 70
End: 2020-06-02
Payer: COMMERCIAL

## 2020-06-02 VITALS
TEMPERATURE: 99.3 F | HEART RATE: 66 BPM | SYSTOLIC BLOOD PRESSURE: 140 MMHG | DIASTOLIC BLOOD PRESSURE: 80 MMHG | OXYGEN SATURATION: 98 %

## 2020-06-02 PROCEDURE — 3331090001 HH PPS REVENUE CREDIT

## 2020-06-02 PROCEDURE — G0300 HHS/HOSPICE OF LPN EA 15 MIN: HCPCS

## 2020-06-02 PROCEDURE — 3331090002 HH PPS REVENUE DEBIT

## 2020-06-03 PROCEDURE — 3331090002 HH PPS REVENUE DEBIT

## 2020-06-03 PROCEDURE — 3331090001 HH PPS REVENUE CREDIT

## 2020-06-05 ENCOUNTER — HOME CARE VISIT (OUTPATIENT)
Dept: SCHEDULING | Facility: HOME HEALTH | Age: 70
End: 2020-06-05
Payer: COMMERCIAL

## 2020-06-05 PROCEDURE — G0300 HHS/HOSPICE OF LPN EA 15 MIN: HCPCS

## 2020-06-08 VITALS
SYSTOLIC BLOOD PRESSURE: 180 MMHG | RESPIRATION RATE: 20 BRPM | HEART RATE: 64 BPM | DIASTOLIC BLOOD PRESSURE: 98 MMHG | OXYGEN SATURATION: 98 %

## 2020-06-09 ENCOUNTER — HOME CARE VISIT (OUTPATIENT)
Dept: HOME HEALTH SERVICES | Facility: HOME HEALTH | Age: 70
End: 2020-06-09
Payer: COMMERCIAL

## 2020-06-12 ENCOUNTER — HOME CARE VISIT (OUTPATIENT)
Dept: HOME HEALTH SERVICES | Facility: HOME HEALTH | Age: 70
End: 2020-06-12
Payer: COMMERCIAL

## 2020-06-19 ENCOUNTER — HOME CARE VISIT (OUTPATIENT)
Dept: HOME HEALTH SERVICES | Facility: HOME HEALTH | Age: 70
End: 2020-06-19

## 2020-06-20 ENCOUNTER — HOME CARE VISIT (OUTPATIENT)
Dept: SCHEDULING | Facility: HOME HEALTH | Age: 70
End: 2020-06-20

## 2020-06-24 ENCOUNTER — HOME CARE VISIT (OUTPATIENT)
Dept: SCHEDULING | Facility: HOME HEALTH | Age: 70
End: 2020-06-24

## 2020-07-01 ENCOUNTER — HOME CARE VISIT (OUTPATIENT)
Dept: HOME HEALTH SERVICES | Facility: HOME HEALTH | Age: 70
End: 2020-07-01

## 2020-07-06 ENCOUNTER — HOME CARE VISIT (OUTPATIENT)
Dept: HOME HEALTH SERVICES | Facility: HOME HEALTH | Age: 70
End: 2020-07-06

## 2020-07-06 NOTE — PROGRESS NOTES
Patient received evaluation for speech language and cognitive linguistic skills s/p CVA. Patient was provided with education on strategies to decrease facial droop. Patient declined speech services stating he is back to his PLOF. No further treatment was provided.

## 2020-08-09 ENCOUNTER — HOSPITAL ENCOUNTER (EMERGENCY)
Age: 70
Discharge: HOME | End: 2020-08-09
Attending: EMERGENCY MEDICINE
Payer: COMMERCIAL

## 2020-08-09 VITALS
HEIGHT: 66 IN | HEART RATE: 55 BPM | SYSTOLIC BLOOD PRESSURE: 185 MMHG | RESPIRATION RATE: 18 BRPM | TEMPERATURE: 98.1 F | DIASTOLIC BLOOD PRESSURE: 86 MMHG | OXYGEN SATURATION: 98 % | WEIGHT: 150 LBS | BODY MASS INDEX: 24.11 KG/M2

## 2020-08-09 DIAGNOSIS — H93.12 TINNITUS OF LEFT EAR: Primary | ICD-10-CM

## 2020-08-09 PROCEDURE — 99284 EMERGENCY DEPT VISIT MOD MDM: CPT

## 2020-08-09 NOTE — ED TRIAGE NOTES
Patient brought in by sister after patient complains of hearing ringing and the sound of a tv playing in his head but the tv is not on.  Says all this started after stroke in May

## 2020-08-09 NOTE — DISCHARGE INSTRUCTIONS
Patient Education        Tinnitus: Care Instructions  Your Care Instructions     Many people have some ringing sounds in their ears once in a while. You may hear a roar, a hiss, a tinkle, or a buzz. The sound usually lasts only a few minutes. If it goes on all the time, you may have tinnitus. Tinnitus is usually caused by long-term exposure to loud noise. This damages the nerves in the inner ear. It can occur with all types of hearing loss. It may be a symptom of almost any ear problem. Tinnitus may be caused by a buildup of earwax. Or it may be caused by ear infections or certain medicines (especially antibiotics or large amounts of aspirin). You can also hear noises in your ears because of an injury to the ears, drinking too much alcohol or caffeine, or a medical condition. You may need tests to evaluate your hearing and to find causes of long-lasting tinnitus. Your doctor may suggest one or more treatments to help you cope with it. You can also do things at home to help reduce symptoms. Follow-up care is a key part of your treatment and safety. Be sure to make and go to all appointments, and call your doctor if you are having problems. It's also a good idea to know your test results and keep a list of the medicines you take. How can you care for yourself at home? · Limit or cut out alcohol and caffeine. They can make your symptoms worse. · Do not smoke or use other tobacco products. Nicotine reduces blood flow to the ear and makes tinnitus worse. If you need help quitting, talk to your doctor about stop-smoking programs and medicines. These can increase your chances of quitting for good. · Talk to your doctor about whether to stop taking aspirin and similar products such as ibuprofen or naproxen. · Get exercise often. It can improve blood flow to the ear. Ways to cope with noise  Some tinnitus may last a long time. To cope with noise, try to:  · Avoid noises that you think caused your tinnitus.  If you can't avoid loud noises, wear earplugs or earmuffs. · Ignore the sound by paying attention to other things. · Relax using biofeedback, meditation, or yoga. Feeling stressed and being tired can make tinnitus worse. · Play music or white noise to help you sleep. Background noise may cover up the noise that you hear in your ears. You can buy a machine that makes soothing sounds, such as ocean waves. When should you call for help? [id number] anytime you think you may need emergency care. For example, call if:  · You have symptoms of a stroke. These may include:  ? Sudden numbness, tingling, weakness, or loss of movement in your face, arm, or leg, especially on only one side of your body. ? Sudden vision changes. ? Sudden trouble speaking. ? Sudden confusion or trouble understanding simple statements. ? Sudden problems with walking or balance. ? A sudden, severe headache that is different from past headaches. Call your doctor now or seek immediate medical care if:  · You develop other symptoms. These may include hearing loss (or worse hearing loss), balance problems, dizziness, nausea, or vomiting. Watch closely for changes in your health, and be sure to contact your doctor if:  · Your tinnitus moves from both ears to one ear. · Your hearing loss gets worse within 1 day after an ear injury. · Your tinnitus or hearing loss does not get better within 1 week after an ear injury. · Your tinnitus bothers you enough that you want to take medicines to help you cope with it. Where can you learn more? Go to http://rio-devi.info/  Enter S165 in the search box to learn more about \"Tinnitus: Care Instructions. \"  Current as of: July 29, 2019               Content Version: 12.5  © 2496-6633 [employer]. Care instructions adapted under license by [employer] (which disclaims liability or warranty for this information).  If you have questions about a medical condition or this instruction, always ask your healthcare professional. [place] [address] any warranty or liability for your use of this information.

## 2020-08-09 NOTE — ED NOTES
Patient states the day before yesterday the ringing started in his right ear. No complaints of a headache, vision changes, has not recently hit his head, no chest pain, no sob.

## 2020-08-09 NOTE — ED PROVIDER NOTES
Pt was told to come to the ED for the ringing in his ear. The history is provided by the patient. No  was used. Ringing in Ear    This is a new problem. The current episode started 2 days ago. The problem occurs constantly. The problem has not changed since onset. Patient complains that the left ear is affected. There has been no fever. The patient is experiencing no pain. Pertinent negatives include no ear discharge, no headaches, no hearing loss, no rhinorrhea, no sore throat, no abdominal pain, no diarrhea, no vomiting, no neck pain, no cough and no rash. His past medical history does not include chronic ear infection, hearing loss or tympanostomy tube. History reviewed. No pertinent past medical history. History reviewed. No pertinent surgical history. History reviewed. No pertinent family history.     Social History     Socioeconomic History    Marital status: SINGLE     Spouse name: Not on file    Number of children: Not on file    Years of education: Not on file    Highest education level: Not on file   Occupational History    Not on file   Social Needs    Financial resource strain: Not on file    Food insecurity     Worry: Not on file     Inability: Not on file    Transportation needs     Medical: Not on file     Non-medical: Not on file   Tobacco Use    Smoking status: Never Smoker    Smokeless tobacco: Never Used   Substance and Sexual Activity    Alcohol use: Not Currently    Drug use: Not on file    Sexual activity: Not on file   Lifestyle    Physical activity     Days per week: Not on file     Minutes per session: Not on file    Stress: Not on file   Relationships    Social connections     Talks on phone: Not on file     Gets together: Not on file     Attends [employer] service: Not on file     Active member of club or organization: Not on file     Attends meetings of clubs or organizations: Not on file     Relationship status: Not on file   [facility] Intimate partner violence     Fear of current or ex partner: Not on file     Emotionally abused: Not on file     Physically abused: Not on file     Forced sexual activity: Not on file   Other Topics Concern    Not on file   Social History Narrative    Not on file         ALLERGIES: Patient has no known allergies. Review of Systems   Constitutional: Negative. HENT: Positive for tinnitus. Negative for ear discharge, hearing loss, rhinorrhea and sore throat. Respiratory: Negative. Negative for cough. Gastrointestinal: Negative. Negative for abdominal pain, diarrhea and vomiting. Musculoskeletal: Negative. Negative for neck pain. Skin: Negative for rash. Neurological: Negative. Negative for headaches. Psychiatric/Behavioral: Negative. All other systems reviewed and are negative. Vitals:    08/09/20 0127 08/09/20 0142   BP: 185/86    Pulse: (!) 55    Resp:  18   Temp: 98.1 °F (36.7 °C)    SpO2: 98%    Weight:  68 kg (150 lb)   Height:  5' 6\" (1.676 m)            Physical Exam  Vitals signs and nursing note reviewed. Constitutional:       General: He is not in acute distress. Appearance: Normal appearance. He is not ill-appearing, toxic-appearing or diaphoretic. HENT:      Head: Normocephalic and atraumatic. Right Ear: Tympanic membrane, ear canal and external ear normal. There is no impacted cerumen. Left Ear: Tympanic membrane, ear canal and external ear normal. There is no impacted cerumen. Nose: Nose normal. No congestion or rhinorrhea. Mouth/Throat:      Mouth: Mucous membranes are moist.      Pharynx: No oropharyngeal exudate or posterior oropharyngeal erythema. Eyes:      Extraocular Movements: Extraocular movements intact. Neck:      Musculoskeletal: Normal range of motion and neck supple. Cardiovascular:      Rate and Rhythm: Normal rate and regular rhythm. Pulses: Normal pulses. Heart sounds: Normal heart sounds.    Pulmonary: Effort: Pulmonary effort is normal.      Breath sounds: Normal breath sounds. Abdominal:      General: There is no distension. Palpations: Abdomen is soft. There is no mass. Tenderness: There is no abdominal tenderness. There is no right CVA tenderness, left CVA tenderness, guarding or rebound. Hernia: No hernia is present. Musculoskeletal: Normal range of motion. Skin:     General: Skin is warm. Capillary Refill: Capillary refill takes less than 2 seconds. Neurological:      General: No focal deficit present. Mental Status: He is alert. Psychiatric:         Mood and Affect: Mood normal.         Behavior: Behavior normal.          MDM  Number of Diagnoses or Management Options  Diagnosis management comments: Patient is nontoxic normal exam normal vitals no distress will have him follow-up with his primary care doctor and will give him ENT to follow-up with. I do not see any issues with his ear.   I looked at his medications I did not see anything that would cause tinnitus will discharge home         Procedures

## 2020-09-21 ENCOUNTER — HOSPITAL ENCOUNTER (EMERGENCY)
Age: 70
Discharge: HOME | End: 2020-09-21
Attending: EMERGENCY MEDICINE
Payer: MEDICARE

## 2020-09-21 ENCOUNTER — APPOINTMENT (OUTPATIENT)
Dept: CT IMAGING | Age: 70
End: 2020-09-21
Attending: EMERGENCY MEDICINE
Payer: MEDICARE

## 2020-09-21 ENCOUNTER — APPOINTMENT (OUTPATIENT)
Dept: MRI IMAGING | Age: 70
End: 2020-09-21
Attending: EMERGENCY MEDICINE
Payer: MEDICARE

## 2020-09-21 VITALS
RESPIRATION RATE: 16 BRPM | SYSTOLIC BLOOD PRESSURE: 144 MMHG | DIASTOLIC BLOOD PRESSURE: 78 MMHG | HEART RATE: 55 BPM | TEMPERATURE: 98.9 F | OXYGEN SATURATION: 100 %

## 2020-09-21 DIAGNOSIS — R29.810 FACIAL DROOP: Primary | ICD-10-CM

## 2020-09-21 LAB
AMPHET UR QL SCN: NEGATIVE
ANION GAP SERPL CALC-SCNC: 4 MMOL/L (ref 3–18)
ATRIAL RATE: 67 BPM
BARBITURATES UR QL SCN: NEGATIVE
BASOPHILS # BLD: 0 K/UL (ref 0–0.1)
BASOPHILS NFR BLD: 0 % (ref 0–2)
BENZODIAZ UR QL: NEGATIVE
BUN SERPL-MCNC: 37 MG/DL (ref 7–18)
BUN/CREAT SERPL: 23 (ref 12–20)
CALCIUM SERPL-MCNC: 9.5 MG/DL (ref 8.5–10.1)
CALCULATED P AXIS, ECG09: 72 DEGREES
CALCULATED R AXIS, ECG10: -14 DEGREES
CALCULATED T AXIS, ECG11: 41 DEGREES
CANNABINOIDS UR QL SCN: NEGATIVE
CHLORIDE SERPL-SCNC: 109 MMOL/L (ref 100–111)
CO2 SERPL-SCNC: 28 MMOL/L (ref 21–32)
COCAINE UR QL SCN: NEGATIVE
CREAT SERPL-MCNC: 1.59 MG/DL (ref 0.6–1.3)
DIAGNOSIS, 93000: NORMAL
DIFFERENTIAL METHOD BLD: ABNORMAL
EOSINOPHIL # BLD: 0.4 K/UL (ref 0–0.4)
EOSINOPHIL NFR BLD: 4 % (ref 0–5)
ERYTHROCYTE [DISTWIDTH] IN BLOOD BY AUTOMATED COUNT: 16 % (ref 11.6–14.5)
ETHANOL SERPL-MCNC: <3 MG/DL (ref 0–3)
GLUCOSE BLD STRIP.AUTO-MCNC: 83 MG/DL (ref 70–110)
GLUCOSE SERPL-MCNC: 106 MG/DL (ref 74–99)
HCT VFR BLD AUTO: 33.9 % (ref 36–48)
HDSCOM,HDSCOM: NORMAL
HGB BLD-MCNC: 11.2 G/DL (ref 13–16)
INR PPP: 1.1 (ref 0.8–1.2)
LYMPHOCYTES # BLD: 2.3 K/UL (ref 0.9–3.6)
LYMPHOCYTES NFR BLD: 26 % (ref 21–52)
MCH RBC QN AUTO: 22.5 PG (ref 24–34)
MCHC RBC AUTO-ENTMCNC: 33 G/DL (ref 31–37)
MCV RBC AUTO: 68.1 FL (ref 74–97)
METHADONE UR QL: NEGATIVE
MONOCYTES # BLD: 0.8 K/UL (ref 0.05–1.2)
MONOCYTES NFR BLD: 9 % (ref 3–10)
NEUTS SEG # BLD: 5.5 K/UL (ref 1.8–8)
NEUTS SEG NFR BLD: 61 % (ref 40–73)
OPIATES UR QL: NEGATIVE
P-R INTERVAL, ECG05: 210 MS
PCP UR QL: NEGATIVE
PLATELET # BLD AUTO: 267 K/UL (ref 135–420)
PMV BLD AUTO: 9.2 FL (ref 9.2–11.8)
POTASSIUM SERPL-SCNC: 3.8 MMOL/L (ref 3.5–5.5)
PROTHROMBIN TIME: 13.7 SEC (ref 11.5–15.2)
Q-T INTERVAL, ECG07: 422 MS
QRS DURATION, ECG06: 110 MS
QTC CALCULATION (BEZET), ECG08: 445 MS
RBC # BLD AUTO: 4.98 M/UL (ref 4.7–5.5)
SODIUM SERPL-SCNC: 141 MMOL/L (ref 136–145)
TROPONIN I SERPL-MCNC: 0.04 NG/ML (ref 0–0.04)
VENTRICULAR RATE, ECG03: 67 BPM
WBC # BLD AUTO: 9 K/UL (ref 4.6–13.2)

## 2020-09-21 PROCEDURE — 80307 DRUG TEST PRSMV CHEM ANLYZR: CPT

## 2020-09-21 PROCEDURE — 70450 CT HEAD/BRAIN W/O DYE: CPT

## 2020-09-21 PROCEDURE — 93005 ELECTROCARDIOGRAM TRACING: CPT

## 2020-09-21 PROCEDURE — 80048 BASIC METABOLIC PNL TOTAL CA: CPT

## 2020-09-21 PROCEDURE — 85610 PROTHROMBIN TIME: CPT

## 2020-09-21 PROCEDURE — 82962 GLUCOSE BLOOD TEST: CPT

## 2020-09-21 PROCEDURE — 84484 ASSAY OF TROPONIN QUANT: CPT

## 2020-09-21 PROCEDURE — 70551 MRI BRAIN STEM W/O DYE: CPT

## 2020-09-21 PROCEDURE — 85025 COMPLETE CBC W/AUTO DIFF WBC: CPT

## 2020-09-21 PROCEDURE — 99285 EMERGENCY DEPT VISIT HI MDM: CPT

## 2020-09-21 NOTE — ED PROVIDER NOTES
EMERGENCY DEPARTMENT HISTORY AND PHYSICAL EXAM    2:57 PM      Date: 9/21/2020  Patient Name: Claudia Macedo    History of Presenting Illness     Chief Complaint   Patient presents with    Facial Droop         History Provided By: Patient  Location/Duration/Severity/Modifying factors   Patient is a 78-year-old male with a history of hypertension, stroke diagnosed in May 2020, dyslipidemia, the presents emergency department with complaint of right-sided facial droop that was noted this morning and per patient. Patient notes that he had a stroke in the past however his symptoms improved. Patient went to his primary doctor today after returning home from [facility] for 4 days and was found to have right-sided facial droop and was sent to the emergency department with concern for stroke. Patient denies any headache, chest pain, shortness of breath, fatigue, or slurred speech. Patient has any trauma. Patient is not a smoker, drinker, or drug user. Patient notes that he is compliant with his medications. Patient denies any other aggravating or alleviating factors. PCP: Shaquille Zheng MD    Current Outpatient Medications   Medication Sig Dispense Refill    amLODIPine (NORVASC) 10 mg tablet Take 1 Tab by mouth daily. 30 Tab 0    aspirin delayed-release 81 mg tablet Take 1 Tab by mouth daily. 30 Tab 0    atorvastatin (LIPITOR) 80 mg tablet Take 1 Tab by mouth nightly. 30 Tab 0    cloNIDine HCL (CATAPRES) 0.1 mg tablet Take 1 Tab by mouth two (2) times a day. 60 Tab 0    clopidogreL (PLAVIX) 75 mg tab Take 1 Tab by mouth daily. 30 Tab 0    hydrALAZINE (APRESOLINE) 25 mg tablet Take 1 Tab by mouth three (3) times daily. 90 Tab 0    metoprolol tartrate (LOPRESSOR) 25 mg tablet Take 1 Tab by mouth every twelve (12) hours. 61 Tab 0       Past History     Past Medical History:  No past medical history on file. Past Surgical History:  No past surgical history on file.     Family History:  No family history on file. Social History:  Social History     Tobacco Use    Smoking status: Never Smoker    Smokeless tobacco: Never Used   Substance Use Topics    Alcohol use: Not Currently    Drug use: Not on file       Allergies:  No Known Allergies      Review of Systems       Review of Systems   Constitutional: Negative for activity change, fatigue and fever. HENT: Negative for congestion and rhinorrhea. Eyes: Negative for visual disturbance. Respiratory: Negative for shortness of breath. Cardiovascular: Negative for chest pain and palpitations. Gastrointestinal: Negative for abdominal pain, diarrhea, nausea and vomiting. Genitourinary: Negative for dysuria and hematuria. Musculoskeletal: Negative for back pain. Skin: Negative for rash. Neurological: Positive for weakness. Negative for dizziness and light-headedness. All other systems reviewed and are negative. Physical Exam     Visit Vitals  BP (!) 144/78   Pulse (!) 55   Temp 98.9 °F (37.2 °C)   Resp 16   SpO2 100%         Physical Exam  Vitals signs and nursing note reviewed. Constitutional:       General: He is not in acute distress. Appearance: He is well-developed. HENT:      Head: Normocephalic and atraumatic. Right Ear: External ear normal.      Left Ear: External ear normal.      Nose: Nose normal.   Eyes:      General: No scleral icterus. Conjunctiva/sclera: Conjunctivae normal.      Pupils: Pupils are equal, round, and reactive to light. Neck:      Musculoskeletal: Normal range of motion and neck supple. Thyroid: No thyromegaly. Vascular: No JVD. Trachea: No tracheal deviation. Cardiovascular:      Rate and Rhythm: Normal rate and regular rhythm. Heart sounds: Normal heart sounds. No murmur. No friction rub. No gallop. Pulmonary:      Effort: Pulmonary effort is normal.      Breath sounds: Normal breath sounds. Chest:      Chest wall: No tenderness.    Abdominal:      General: Bowel sounds are normal. There is no distension. Palpations: Abdomen is soft. Tenderness: There is no abdominal tenderness. There is no guarding or rebound. Musculoskeletal: Normal range of motion. General: No tenderness. Lymphadenopathy:      Cervical: No cervical adenopathy. Skin:     General: Skin is warm and dry. Neurological:      Mental Status: He is alert and oriented to person, place, and time. Cranial Nerves: Cranial nerve deficit present. Coordination: Coordination normal.      Comments: Dysarthria noted, right-sided facial droop sparing his forehead, gait steady   Psychiatric:         Behavior: Behavior normal.         Thought Content:  Thought content normal.         Judgment: Judgment normal.           Diagnostic Study Results     Labs -  Recent Results (from the past 12 hour(s))   GLUCOSE, POC    Collection Time: 09/21/20  3:15 PM   Result Value Ref Range    Glucose (POC) 83 70 - 110 mg/dL   EKG, 12 LEAD, INITIAL    Collection Time: 09/21/20  3:18 PM   Result Value Ref Range    Ventricular Rate 67 BPM    Atrial Rate 67 BPM    P-R Interval 210 ms    QRS Duration 110 ms    Q-T Interval 422 ms    QTC Calculation (Bezet) 445 ms    Calculated P Axis 72 degrees    Calculated R Axis -14 degrees    Calculated T Axis 41 degrees    Diagnosis       Sinus rhythm with 1st degree AV block  Minimal voltage criteria for LVH, may be normal variant  ST & T wave abnormality, consider lateral ischemia  Abnormal ECG  When compared with ECG of 14-MAY-2020 19:52,  No significant change was found  Confirmed by Warnell Osler, MD, Hadley Anand ([id number]) on 9/21/2020 5:33:47 PM     CBC WITH AUTOMATED DIFF    Collection Time: 09/21/20  3:19 PM   Result Value Ref Range    WBC 9.0 4.6 - 13.2 K/uL    RBC 4.98 4.70 - 5.50 M/uL    HGB 11.2 (L) 13.0 - 16.0 g/dL    HCT 33.9 (L) 36.0 - 48.0 %    MCV 68.1 (L) 74.0 - 97.0 FL    MCH 22.5 (L) 24.0 - 34.0 PG    MCHC 33.0 31.0 - 37.0 g/dL    RDW 16.0 (H) 11.6 - 14.5 %    PLATELET 267 135 - 420 K/uL    MPV 9.2 9.2 - 11.8 FL    NEUTROPHILS 61 40 - 73 %    LYMPHOCYTES 26 21 - 52 %    MONOCYTES 9 3 - 10 %    EOSINOPHILS 4 0 - 5 %    BASOPHILS 0 0 - 2 %    ABS. NEUTROPHILS 5.5 1.8 - 8.0 K/UL    ABS. LYMPHOCYTES 2.3 0.9 - 3.6 K/UL    ABS. MONOCYTES 0.8 0.05 - 1.2 K/UL    ABS. EOSINOPHILS 0.4 0.0 - 0.4 K/UL    ABS. BASOPHILS 0.0 0.0 - 0.1 K/UL    DF AUTOMATED     METABOLIC PANEL, BASIC    Collection Time: 09/21/20  3:19 PM   Result Value Ref Range    Sodium 141 136 - 145 mmol/L    Potassium 3.8 3.5 - 5.5 mmol/L    Chloride 109 100 - 111 mmol/L    CO2 28 21 - 32 mmol/L    Anion gap 4 3.0 - 18 mmol/L    Glucose 106 (H) 74 - 99 mg/dL    BUN 37 (H) 7.0 - 18 MG/DL    Creatinine 1.59 (H) 0.6 - 1.3 MG/DL    BUN/Creatinine ratio 23 (H) 12 - 20      GFR est AA 52 (L) >60 ml/min/1.73m2    GFR est non-AA 43 (L) >60 ml/min/1.73m2    Calcium 9.5 8.5 - 10.1 MG/DL   TROPONIN I    Collection Time: 09/21/20  3:19 PM   Result Value Ref Range    Troponin-I, QT 0.04 0.0 - 0.045 NG/ML   PROTHROMBIN TIME + INR    Collection Time: 09/21/20  3:19 PM   Result Value Ref Range    Prothrombin time 13.7 11.5 - 15.2 sec    INR 1.1 0.8 - 1.2     ETHYL ALCOHOL    Collection Time: 09/21/20  3:19 PM   Result Value Ref Range    ALCOHOL(ETHYL),SERUM <3 0 - 3 MG/DL   DRUG SCREEN, URINE    Collection Time: 09/21/20  3:19 PM   Result Value Ref Range    BENZODIAZEPINES Negative NEG      BARBITURATES Negative NEG      THC (TH-CANNABINOL) Negative NEG      OPIATES Negative NEG      PCP(PHENCYCLIDINE) Negative NEG      COCAINE Negative NEG      AMPHETAMINES Negative NEG      METHADONE Negative NEG      HDSCOM (NOTE)        Radiologic Studies -   CT HEAD WO CONT   Final Result   IMPRESSION:       No definite evidence of acute hemorrhage or ischemia. Interval evolution of left corona radiata infarct. Chronic microangiopathy and   additional remote lacunar infarcts as discussed. If symptoms persist MRI can be obtained.       Findings discussed on September 21, 2020 Dr. Noé Garcia at 2421 with read back. MRI BRAIN WO CONT    (Results Pending)         Medical Decision Making   I am the first provider for this patient. I reviewed the vital signs, available nursing notes, past medical history, past surgical history, family history and social history. Vital Signs-Reviewed the patient's vital signs. EKG: Sinus rhythm with first-degree AV block, LVH, no STEMI, lateral T wave inversions, , interpreted by me    Records Reviewed: Nursing Notes, Old Medical Records, Previous Radiology Studies and Previous Laboratory Studies (Time of Review: 2:57 PM)    ED Course: Progress Notes, Reevaluation, and Consults:     Patient was seen on arrival and sent from his primary care's office with a right-sided facial droop that was noted this morning. Patient just returned back from [place]. Code S was called on arrival.Arron Long, DO 2:57 PM    Discussed the case with tele-neurology and it appears the patient did have an admission for subacute left corona radiata stroke in May 2020. Patient is not likely a TPA candidate however unsure if the patient has a worsening of his symptoms and will proceed with the stroke work-up. Helene Melara, DO 3:08 PM    The radiologist called the patient has nothing clearly acute on the CT. Helene Melara, DO 3:24 PM    I discussed the case with Dr. Niesha Velazquez and he knows the patient well and says he has had a right-sided facial droop since May. The only concern was that he was having some dysarthria which is new for him. He like the patient have MRI of the brain while he is in the emergency department and will decide on admission after that. Helene Melara, DO 4:31 PM    Patient is back from MRI and will follow the result. The patient would like to go home and if the MRI shows no acute process will have patient follow closely with Dr. Niesha Velazquez.   Will sign out the case to Dr. Jennifer Barrett to follow the result. Bravo Newton DO 9:38 PM        Patient's MRI returned prior to the patient being signed out. The patient's MRI based on the preliminary reading by Dr. Blanca Bullard shows no acute findings. Given the patient would like to go home we will proceed with close outpatient care. Bravo Newton DO 10:05 PM                Provider Notes (Medical Decision Making):   MDM  Number of Diagnoses or Management Options  Diagnosis management comments: Patient is a 69-year-old male with a history of a left corona radiata stroke in May 2020 and is on dual antiplatelet therapy the presents emergency department with complaint of right-sided facial droop noted at the primary doctor's office today. Patient apparently had a recovery of the symptoms and now they have worsened. Patient is not a TPA candidate after being evaluated by tele-neurology and recommendation is to admit the patient with permissive hypertension, increase the patient to full dose aspirin, and have the patient admitted for MRI and further stroke work-up. Bravo Newton DO 4:07 PM        Procedures    Critical Care Time: Critical Care Time:  The services I provided to this patient were to treat and/or prevent clinically significant deterioration that could result in the failure of one or more body systems and/or organ systems due to acute stroke evaluation with TPA consideration. Services included the following:  -reviewing nursing notes and old charts  -vital sign assessments  -direct patient care  -medication orders and management  -interpreting and reviewing diagnostic studies/labs  -re-evaluations  -documentation time    Aggregate critical care time was 45 minutes, which includes only time during which I was engaged in work directly related to the patient's care as described above, whether I was at bedside or elsewhere in the Emergency Department.  It did not include time spent performing other reported procedures or the services of residents, students, nurses, or advance practice providers. Colleen WhartonDO jes 9:39 PM      Diagnosis     Clinical Impression:   1. Facial droop        Disposition: DC    Follow-up Information    None          Patient's Medications   Start Taking    No medications on file   Continue Taking    AMLODIPINE (NORVASC) 10 MG TABLET    Take 1 Tab by mouth daily. ASPIRIN DELAYED-RELEASE 81 MG TABLET    Take 1 Tab by mouth daily. ATORVASTATIN (LIPITOR) 80 MG TABLET    Take 1 Tab by mouth nightly. CLONIDINE HCL (CATAPRES) 0.1 MG TABLET    Take 1 Tab by mouth two (2) times a day. CLOPIDOGREL (PLAVIX) 75 MG TAB    Take 1 Tab by mouth daily. HYDRALAZINE (APRESOLINE) 25 MG TABLET    Take 1 Tab by mouth three (3) times daily. METOPROLOL TARTRATE (LOPRESSOR) 25 MG TABLET    Take 1 Tab by mouth every twelve (12) hours. These Medications have changed    No medications on file   Stop Taking    No medications on file     Disclaimer: Sections of this note are dictated using utilizing voice recognition software. Minor typographical errors may be present. If questions arise, please do not hesitate to contact me or call our department.

## 2020-09-21 NOTE — ED TRIAGE NOTES
Pt arrived via ems from doctors office with a CC of facial droop. Pt said it was last normal was yesterday.  Pt has history of stroke with no residual symptoms

## 2020-09-22 NOTE — ED NOTES
Bedside and verbal shift report given by Justin Ruiz RN (off going nurse) to Andrei Joel RN (oncoming nurse). Report included the following information SBAR and ED Summary.

## 2021-02-25 ENCOUNTER — TRANSCRIBE ORDER (OUTPATIENT)
Dept: SCHEDULING | Age: 71
End: 2021-02-25

## 2021-02-25 DIAGNOSIS — I63.033 CEREBRAL INFARCTION DUE TO BILATERAL THROMBOSIS OF CAROTID ARTERIES (HCC): Primary | ICD-10-CM

## 2021-02-25 DIAGNOSIS — G40.019 BENIGN ROLANDIC EPILEPSY, INTRACTABLE (HCC): ICD-10-CM

## 2021-04-01 ENCOUNTER — HOSPITAL ENCOUNTER (OUTPATIENT)
Dept: MRI IMAGING | Age: 71
Discharge: HOME | End: 2021-04-01
Payer: MEDICARE

## 2021-04-01 ENCOUNTER — HOSPITAL ENCOUNTER (OUTPATIENT)
Dept: NEUROLOGY | Age: 71
End: 2021-04-01
Payer: MEDICARE

## 2021-04-01 DIAGNOSIS — I63.033 CEREBRAL INFARCTION DUE TO BILATERAL THROMBOSIS OF CAROTID ARTERIES (HCC): ICD-10-CM

## 2021-04-01 PROCEDURE — 70551 MRI BRAIN STEM W/O DYE: CPT

## 2021-05-11 ENCOUNTER — TRANSCRIBE ORDER (OUTPATIENT)
Dept: SCHEDULING | Age: 71
End: 2021-05-11

## 2021-05-11 DIAGNOSIS — I63.033 CEREBRAL INFARCTION DUE TO BILATERAL THROMBOSIS OF CAROTID ARTERIES (HCC): ICD-10-CM

## 2021-05-11 DIAGNOSIS — G40.019 BENIGN ROLANDIC EPILEPSY, INTRACTABLE (HCC): Primary | ICD-10-CM

## 2021-05-17 ENCOUNTER — HOSPITAL ENCOUNTER (OUTPATIENT)
Dept: NEUROLOGY | Age: 71
Discharge: HOME | End: 2021-05-17
Payer: MEDICARE

## 2021-05-17 DIAGNOSIS — G40.019 BENIGN ROLANDIC EPILEPSY, INTRACTABLE (HCC): ICD-10-CM

## 2021-05-17 DIAGNOSIS — I63.033 CEREBRAL INFARCTION DUE TO BILATERAL THROMBOSIS OF CAROTID ARTERIES (HCC): ICD-10-CM

## 2021-05-17 PROCEDURE — 95819 EEG AWAKE AND ASLEEP: CPT

## 2021-05-17 PROCEDURE — 95819 EEG AWAKE AND ASLEEP: CPT | Performed by: STUDENT IN AN ORGANIZED HEALTH CARE EDUCATION/TRAINING PROGRAM

## 2021-05-17 PROCEDURE — 95816 EEG AWAKE AND DROWSY: CPT

## 2021-05-21 NOTE — PROCEDURES
[address] [place] [address]   EEG    Name:  Jose Ring  MR#:   [id number]  :  1950  ACCOUNT #:  [de-identified]  DATE OF SERVICE:  2021      OUTPATIENT RECORDING    REFERRING PROVIDER:  EEG was ordered by Dr. Karlos Trujillo    EEG NUMBER:  MV EEG     REASON FOR EEG:  For evaluation of memory problems. EEG TECHNIQUE USED:  Standard 10-20 system with 16-channel recording and an EKG. Activation procedure used was photic stimulation. Total duration of this recording was 30 minutes. This is mainly an awake EEG recording. EEG REPORT:  The background consists of posterior dominant alpha rhythms at a frequency of 9 Hz and they attenuate to eye opening. No significant asymmetry between the right and left sides. There are no obvious spikes or sharp wave discharges. No focal slowing. Photic stimulation did not induce any abnormal discharges. IMPRESSION:  This is basically a normal EEG recording. CLINICAL CORRELATION:  Normal EEG does not rule out the possibility of seizures or epilepsy.       MD LUCIANO [email or web address]  D:  2021 11:25  T:  2021 13:24  JOB #:  [id number]

## 2022-03-18 PROBLEM — R29.810 FACIAL DROOP: Status: ACTIVE | Noted: 2020-05-14

## 2022-03-19 PROBLEM — I16.1 HYPERTENSIVE EMERGENCY: Status: ACTIVE | Noted: 2020-05-14

## 2022-03-20 PROBLEM — I63.9 ACUTE ISCHEMIC STROKE (HCC): Status: ACTIVE | Noted: 2020-05-14

## 2022-09-06 ENCOUNTER — OFFICE VISIT (OUTPATIENT)
Dept: CARDIOLOGY CLINIC | Age: 72
End: 2022-09-06
Payer: MEDICARE

## 2022-09-06 VITALS
BODY MASS INDEX: 22.98 KG/M2 | HEART RATE: 61 BPM | HEIGHT: 66 IN | OXYGEN SATURATION: 100 % | SYSTOLIC BLOOD PRESSURE: 172 MMHG | WEIGHT: 143 LBS | DIASTOLIC BLOOD PRESSURE: 98 MMHG

## 2022-09-06 DIAGNOSIS — I10 HYPERTENSION, UNSPECIFIED TYPE: ICD-10-CM

## 2022-09-06 DIAGNOSIS — I11.9 HYPERTENSIVE HEART DISEASE WITHOUT HEART FAILURE: Primary | ICD-10-CM

## 2022-09-06 DIAGNOSIS — E78.5 DYSLIPIDEMIA: ICD-10-CM

## 2022-09-06 DIAGNOSIS — Z86.73 OLD CEREBROVASCULAR ACCIDENT (CVA) WITHOUT LATE EFFECT: ICD-10-CM

## 2022-09-06 PROBLEM — I63.9 STROKE (HCC): Status: ACTIVE | Noted: 2020-05-14

## 2022-09-06 PROCEDURE — G8510 SCR DEP NEG, NO PLAN REQD: HCPCS | Performed by: INTERNAL MEDICINE

## 2022-09-06 PROCEDURE — G8420 CALC BMI NORM PARAMETERS: HCPCS | Performed by: INTERNAL MEDICINE

## 2022-09-06 PROCEDURE — 1123F ACP DISCUSS/DSCN MKR DOCD: CPT | Performed by: INTERNAL MEDICINE

## 2022-09-06 PROCEDURE — 1101F PT FALLS ASSESS-DOCD LE1/YR: CPT | Performed by: INTERNAL MEDICINE

## 2022-09-06 PROCEDURE — 99204 OFFICE O/P NEW MOD 45 MIN: CPT | Performed by: INTERNAL MEDICINE

## 2022-09-06 PROCEDURE — 3017F COLORECTAL CA SCREEN DOC REV: CPT | Performed by: INTERNAL MEDICINE

## 2022-09-06 PROCEDURE — G8427 DOCREV CUR MEDS BY ELIG CLIN: HCPCS | Performed by: INTERNAL MEDICINE

## 2022-09-06 PROCEDURE — G8536 NO DOC ELDER MAL SCRN: HCPCS | Performed by: INTERNAL MEDICINE

## 2022-09-06 PROCEDURE — 93000 ELECTROCARDIOGRAM COMPLETE: CPT | Performed by: INTERNAL MEDICINE

## 2022-09-06 RX ORDER — OLMESARTAN MEDOXOMIL AND HYDROCHLOROTHIAZIDE 40/12.5 40; 12.5 MG/1; MG/1
1 TABLET ORAL DAILY
Qty: 30 TABLET | Refills: 6 | Status: SHIPPED | OUTPATIENT
Start: 2022-09-06

## 2022-09-06 RX ORDER — LISINOPRIL 40 MG/1
40 TABLET ORAL DAILY
COMMUNITY
Start: 2022-06-29 | End: 2022-09-06 | Stop reason: ALTCHOICE

## 2022-09-06 NOTE — PROGRESS NOTES
Jaime Niurka. presents today for   Chief Complaint   Patient presents with    New Patient     Referred by pcp for HTN       Jaime Niurka. preferred language for health care discussion is english/other. Is someone accompanying this pt? yes    Is the patient using any DME equipment during [address]? no    Depression Screening:  3 most recent PHQ Screens 9/6/2022   Little interest or pleasure in doing things Not at all   Feeling down, depressed, irritable, or hopeless Not at all   Total Score PHQ 2 0       Learning Assessment:  Learning Assessment 9/6/2022   PRIMARY LEARNER Patient   PRIMARY LANGUAGE ENGLISH   LEARNER PREFERENCE PRIMARY DEMONSTRATION   ANSWERED BY patient   RELATIONSHIP SELF       Abuse Screening:  Abuse Screening Questionnaire 9/6/2022   Do you ever feel afraid of your partner? N   Are you in a relationship with someone who physically or mentally threatens you? N   Is it safe for you to go home? Y       Fall Risk  Fall Risk Assessment, last 12 mths 9/6/2022   Able to walk? Yes   Fall in past 12 months? 0   Do you feel unsteady? 0   Are you worried about falling 0           Pt currently taking Anticoagulant therapy? no    Pt currently taking Antiplatelet therapy ? Aspirin 81 mg daily      Coordination of Care:  1. Have you been to the ER, urgent care clinic since your last visit? Hospitalized since your last visit? no    2. Have you seen or consulted any other health care providers outside of the [address] [place] [address] since your last visit? Include any pap smears or colon screening.  no

## 2022-09-06 NOTE — PATIENT INSTRUCTIONS
Follow up with Dr. Bernadette Whiteside in 2 months  Stop Lisinopril  Start Olmesartan/HCTZ 40/12.5mg daily

## 2022-09-06 NOTE — PROGRESS NOTES
HISTORY OF PRESENT ILLNESS  Ramirez Reid is a 66 y.o. male. New Patient  Pertinent negatives include no chest pain, no abdominal pain, no headaches and no shortness of breath. Patient presents for a new office visit. He was referred here by his PCP for evaluation of difficult to control hypertension. He states he has a longstanding history of hypertension that is likely poorly controlled for many years. He states he did not follow-up with a physician regularly up until 2020 when he presented to the hospital with an acute CVA and hypertensive emergency. 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 He was also found to have a subacute lacunar stroke. He underwent an echocardiogram in May 2020 which showed preserved LV function, EF 55%, moderate to severe concentric LVH, advanced diastolic dysfunction, severe left atrial enlargement, mild aortic insufficiency and a negative bubble study. According to the patient and his sister, his blood pressure has been difficult to control on his current regimen which includes amlodipine and lisinopril. Patient is very active he tries to do some physical exercise at least 30 minutes most days of the week. He denies any chest pain, shortness of breath, leg swelling, no orthopnea, no PND. Past Medical History:   Diagnosis Date    H/O echocardiogram 05/2020    EF 55%, moderate to severe concentric LVH, advanced diastolic dysfunction, severe LAE, mild AI    Primary hypertension     Stroke [facility])      Current Outpatient Medications   Medication Sig Dispense Refill    olmesartan-hydroCHLOROthiazide (BENICAR HCT) 40-12.5 mg per tablet Take 1 Tablet by mouth daily. 30 Tablet 6    amLODIPine (NORVASC) 10 mg tablet Take 1 Tab by mouth daily. 30 Tab 0    aspirin delayed-release 81 mg tablet Take 1 Tab by mouth daily.  30 Tab 0     No Known Allergies     Social History     Tobacco Use    Smoking status: Never    Smokeless tobacco: Never   Vaping Use    Vaping Use: Never used   Substance Use Topics    Alcohol use: Not Currently    Drug use: Never     Family History   Problem Relation Age of Onset    Hypertension Mother     Heart Attack Mother     Cancer Father          Review of Systems   Constitutional:  Negative for chills, fever and weight loss. HENT:  Negative for nosebleeds. Eyes:  Negative for blurred vision and double vision. Respiratory:  Negative for cough, shortness of breath and wheezing. Cardiovascular:  Negative for chest pain, palpitations, orthopnea, claudication, leg swelling and PND. Gastrointestinal:  Negative for abdominal pain, heartburn, nausea and vomiting. Genitourinary:  Negative for dysuria and hematuria. Musculoskeletal:  Negative for falls and myalgias. Skin:  Negative for rash. Neurological:  Negative for dizziness, focal weakness and headaches. Endo/Heme/Allergies:  Does not bruise/bleed easily. Psychiatric/Behavioral:  Negative for substance abuse. Visit Vitals  BP (!) 172/98 (BP 1 Location: Left upper arm, BP Patient Position: Sitting, BP Cuff Size: Adult)   Pulse 61   Ht 5' 6\" (1.676 m)   Wt 64.9 kg (143 lb)   SpO2 100%   BMI 23.08 kg/m²       Physical Exam  Constitutional:       Appearance: He is well-developed. HENT:      Head: Normocephalic and atraumatic. Eyes:      Conjunctiva/sclera: Conjunctivae normal.   Neck:      Vascular: No carotid bruit or JVD. Cardiovascular:      Rate and Rhythm: Normal rate and regular rhythm. Pulses: Normal pulses. Heart sounds: S1 normal and S2 normal. No murmur heard. No gallop. No S3 sounds. Pulmonary:      Breath sounds: Normal breath sounds. No wheezing or rales. Abdominal:      General: Bowel sounds are normal.      Palpations: Abdomen is soft. Tenderness: There is no abdominal tenderness. Musculoskeletal:         General: No swelling or deformity. Cervical back: Neck supple.    Skin:     General: Skin is warm and dry. Neurological:      General: No focal deficit present. Mental Status: He is alert and oriented to person, place, and time. Psychiatric:         Mood and Affect: Mood normal.     EKG: Normal sinus rhythm, leftward axis, voltage criteria for LVH, poor R wave progression, diffuse ST-T abnormality likely due to repolarization changes. No change compared to previous EKGs. ASSESSMENT and PLAN  Encounter Diagnoses   Name Primary? Hypertensive heart disease without heart failure Yes    Hypertension, unspecified type     Old cerebrovascular accident (CVA) without late effect     Dyslipidemia      Hypertensive heart disease. This was diagnosed by echocardiogram in May 2020 when he was admitted to the hospital with an acute CVA and hypertensive emergency. His echocardiogram showed preserved LV function, EF 55%, moderate to severe LVH with advanced diastolic dysfunction and severe left atrial enlargement. This is likely due to years of poorly controlled high blood pressure. His blood pressure still remains elevated. I recommended stopping his lisinopril altogether. I would like to start olmesartan HCTZ 40/12.5 mg to his regimen. He can continue his current amlodipine dosage. If he needs additional blood pressure lowering I would add spironolactone or eplerenone next. He does not appear to be volume overloaded on exam today. Abnormal EKG. This likely due to his hypertensive heart disease. Dyslipidemia. Patient was previously taking atorvastatin 80 mg daily. He states that at some point over the past year this was discontinued. I will defer starting a statin to his PCP. History of CVA. This occurred in 2020. This was in the setting of uncontrolled hypertension. Follow-up in 2 months, sooner if needed.

## 2022-10-06 ENCOUNTER — TELEPHONE (OUTPATIENT)
Dept: CARDIOLOGY CLINIC | Age: 72
End: 2022-10-06

## 2022-10-06 NOTE — TELEPHONE ENCOUNTER
Received call from patient's sister stating patient was seen by PCP and Norvasc was stopped due to patient's complaints of dizziness. States Eplerenone was suggested but not ordered and patient is asking if this medication is needed. States patient decreased his Amlodipine to 5mg, but is still having dizziness. States his blood pressures have been between 163'R - 649'B systolic. Discussed with Dr. Hien Last and record reviewed. Verbal order and read back per Lyudmila Tobias MD  - inform patient his is not recommending Eplerenone at this time; patient is receiving HCTZ with his Olmesartan  - continue to monitor blood pressures  - continue 5mg of Amlodipine - doesn't feel dizziness from Amlodipine due to blood pressures in 130's and not lower  This has been fully explained to the patient's sister, who indicates understanding.

## 2022-11-15 ENCOUNTER — OFFICE VISIT (OUTPATIENT)
Dept: CARDIOLOGY CLINIC | Age: 72
End: 2022-11-15
Payer: MEDICARE

## 2022-11-15 VITALS
BODY MASS INDEX: 23.46 KG/M2 | HEIGHT: 66 IN | WEIGHT: 146 LBS | DIASTOLIC BLOOD PRESSURE: 90 MMHG | HEART RATE: 70 BPM | SYSTOLIC BLOOD PRESSURE: 190 MMHG | OXYGEN SATURATION: 98 %

## 2022-11-15 DIAGNOSIS — E78.5 DYSLIPIDEMIA: ICD-10-CM

## 2022-11-15 DIAGNOSIS — I10 HYPERTENSION, UNSPECIFIED TYPE: Primary | ICD-10-CM

## 2022-11-15 DIAGNOSIS — Z86.73 OLD CEREBROVASCULAR ACCIDENT (CVA) WITHOUT LATE EFFECT: ICD-10-CM

## 2022-11-15 PROCEDURE — G8536 NO DOC ELDER MAL SCRN: HCPCS | Performed by: INTERNAL MEDICINE

## 2022-11-15 PROCEDURE — G8420 CALC BMI NORM PARAMETERS: HCPCS | Performed by: INTERNAL MEDICINE

## 2022-11-15 PROCEDURE — G8427 DOCREV CUR MEDS BY ELIG CLIN: HCPCS | Performed by: INTERNAL MEDICINE

## 2022-11-15 PROCEDURE — 1101F PT FALLS ASSESS-DOCD LE1/YR: CPT | Performed by: INTERNAL MEDICINE

## 2022-11-15 PROCEDURE — 1123F ACP DISCUSS/DSCN MKR DOCD: CPT | Performed by: INTERNAL MEDICINE

## 2022-11-15 PROCEDURE — 3077F SYST BP >= 140 MM HG: CPT | Performed by: INTERNAL MEDICINE

## 2022-11-15 PROCEDURE — 3078F DIAST BP <80 MM HG: CPT | Performed by: INTERNAL MEDICINE

## 2022-11-15 PROCEDURE — 99214 OFFICE O/P EST MOD 30 MIN: CPT | Performed by: INTERNAL MEDICINE

## 2022-11-15 PROCEDURE — G8510 SCR DEP NEG, NO PLAN REQD: HCPCS | Performed by: INTERNAL MEDICINE

## 2022-11-15 PROCEDURE — 3017F COLORECTAL CA SCREEN DOC REV: CPT | Performed by: INTERNAL MEDICINE

## 2022-11-15 RX ORDER — AMLODIPINE BESYLATE 5 MG/1
5 TABLET ORAL 2 TIMES DAILY
Qty: 60 TABLET | Refills: 6 | Status: SHIPPED | OUTPATIENT
Start: 2022-11-15

## 2022-11-15 NOTE — PATIENT INSTRUCTIONS
Follow up with Dr. Kary Hyatt in 6 months  Follow up with Sancho Cruz in 3 months-medication management  Change Amlodipine to 5mg tablet - take 1 twice a day

## 2022-11-15 NOTE — PROGRESS NOTES
HISTORY OF PRESENT ILLNESS  Rj Daly is a 67 y.o. male. Follow-up  Pertinent negatives include no chest pain, no abdominal pain, no headaches and no shortness of breath. Patient presents for a follow-up office visit. He was initially referred here by his PCP for evaluation of difficult to control hypertension. He states he has a longstanding history of hypertension that is likely poorly controlled for many years. He states he did not follow-up with a physician regularly up until 2020 when he presented to the hospital with an acute CVA and hypertensive emergency. 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 He was also found to have a subacute lacunar stroke. He underwent an echocardiogram in May 2020 which showed preserved LV function, EF 55%, moderate to severe concentric LVH, advanced diastolic dysfunction, severe left atrial enlargement, mild aortic insufficiency and a negative bubble study. At last visit, his blood pressure medications were adjusted. His lisinopril was stopped and he was started on olmesartan HCTZ. He continued taking his current amlodipine dosage. According to his sister, she noticed that when he takes his amlodipine dose in the evenings, he will develop a subtle change in his facial expression. He is still exercising regularly and trying to run several days a week and has not noted any new exertional symptoms. He admits he does not check his blood pressure regularly. Past Medical History:   Diagnosis Date    H/O echocardiogram 05/2020    EF 55%, moderate to severe concentric LVH, advanced diastolic dysfunction, severe LAE, mild AI    Primary hypertension     Stroke [facility])      Current Outpatient Medications   Medication Sig Dispense Refill    amLODIPine (NORVASC) 5 mg tablet Take 1 Tablet by mouth two (2) times a day.  60 Tablet 6    olmesartan-hydroCHLOROthiazide (BENICAR HCT) 40-12.5 mg per tablet Take 1 Tablet by mouth daily. 30 Tablet 6    aspirin delayed-release 81 mg tablet Take 1 Tab by mouth daily. 30 Tab 0     No Known Allergies     Social History     Tobacco Use    Smoking status: Never    Smokeless tobacco: Never   Vaping Use    Vaping Use: Never used   Substance Use Topics    Alcohol use: Not Currently    Drug use: Never     Family History   Problem Relation Age of Onset    Hypertension Mother     Heart Attack Mother     Cancer Father          Review of Systems   Constitutional:  Negative for chills, fever and weight loss. HENT:  Negative for nosebleeds. Eyes:  Negative for blurred vision and double vision. Respiratory:  Negative for cough, shortness of breath and wheezing. Cardiovascular:  Negative for chest pain, palpitations, orthopnea, claudication, leg swelling and PND. Gastrointestinal:  Negative for abdominal pain, heartburn, nausea and vomiting. Genitourinary:  Negative for dysuria and hematuria. Musculoskeletal:  Negative for falls and myalgias. Skin:  Negative for rash. Neurological:  Negative for dizziness, focal weakness and headaches. Endo/Heme/Allergies:  Does not bruise/bleed easily. Psychiatric/Behavioral:  Negative for substance abuse. Visit Vitals  BP (!) 190/90 (BP 1 Location: Left upper arm, BP Patient Position: Sitting, BP Cuff Size: Adult)   Pulse 70   Ht 5' 6\" (1.676 m)   Wt 66.2 kg (146 lb)   SpO2 98%   BMI 23.57 kg/m²       Physical Exam  Constitutional:       Appearance: He is well-developed. HENT:      Head: Normocephalic and atraumatic. Eyes:      Conjunctiva/sclera: Conjunctivae normal.   Neck:      Vascular: No carotid bruit or JVD. Cardiovascular:      Rate and Rhythm: Normal rate and regular rhythm. Pulses: Normal pulses. Heart sounds: S1 normal and S2 normal. No murmur heard. No gallop. No S3 sounds. Pulmonary:      Breath sounds: Normal breath sounds. No wheezing or rales.    Abdominal:      General: Bowel sounds are normal. Palpations: Abdomen is soft. Tenderness: There is no abdominal tenderness. Musculoskeletal:         General: No swelling or deformity. Cervical back: Neck supple. Skin:     General: Skin is warm and dry. Neurological:      General: No focal deficit present. Mental Status: He is alert and oriented to person, place, and time. Psychiatric:         Mood and Affect: Mood normal.     ASSESSMENT and PLAN  Encounter Diagnoses   Name Primary? Hypertension, unspecified type Yes    Old cerebrovascular accident (CVA) without late effect     Dyslipidemia      Labile hypertension. Patient's initial blood pressure was mildly elevated but repeat was even higher. He does not check this regularly at home. He may be having transient low readings with the amlodipine 10 mg that home, so I recommend he split this up into 2 doses 5 mg in the morning and 5 mg in the evening continue to take the olmesartan/HCTZ every morning. I have also advised him to start checking his blood pressure frequently at home and recording the readings. Hypertensive heart disease. This was diagnosed by echocardiogram in May 2020 when he was admitted to the hospital with an acute CVA and hypertensive emergency. His echocardiogram showed preserved LV function, EF 55%, moderate to severe LVH with advanced diastolic dysfunction and severe left atrial enlargement. No signs or symptoms of heart failure. No need for additional diuretic therapy. Dyslipidemia. Patient was previously taking atorvastatin 80 mg daily. He states that at some point over the past year this was discontinued. I will defer starting a statin to his PCP. History of CVA. This occurred in 2020. This was in the setting of uncontrolled hypertension. I would focus on blood pressure control, continuing his aspirin and restarting a statin as described above. Follow-up with our nurse practitioner in 3 months for further medication adjustments.   Follow-up with me in 6 months, sooner if needed.

## 2022-11-15 NOTE — PROGRESS NOTES
Anabelle Ruiz. presents today for   Chief Complaint   Patient presents with    Follow-up     2 month       Anabelle Ruiz. preferred language for health care discussion is english/other. Is someone accompanying this pt? no    Is the patient using any DME equipment during [address]? no    Depression Screening:  3 most recent PHQ Screens 11/15/2022   Little interest or pleasure in doing things Not at all   Feeling down, depressed, irritable, or hopeless Not at all   Total Score PHQ 2 0       Learning Assessment:  Learning Assessment 11/15/2022   PRIMARY LEARNER Patient   PRIMARY LANGUAGE ENGLISH   LEARNER PREFERENCE PRIMARY DEMONSTRATION   ANSWERED BY patient   RELATIONSHIP SELF       Abuse Screening:  Abuse Screening Questionnaire 11/15/2022   Do you ever feel afraid of your partner? N   Are you in a relationship with someone who physically or mentally threatens you? N   Is it safe for you to go home? Y       Fall Risk  Fall Risk Assessment, last 12 mths 11/15/2022   Able to walk? Yes   Fall in past 12 months? 0   Do you feel unsteady? 0   Are you worried about falling 0           Pt currently taking Anticoagulant therapy? no    Pt currently taking Antiplatelet therapy ? Aspirin 81 mg daily      Coordination of Care:  1. Have you been to the ER, urgent care clinic since your last visit? Hospitalized since your last visit? no    2. Have you seen or consulted any other health care providers outside of the [address] [place] [address] since your last visit? Include any pap smears or colon screening.  no

## 2023-02-10 ENCOUNTER — OFFICE VISIT (OUTPATIENT)
Dept: CARDIOLOGY CLINIC | Age: 73
End: 2023-02-10
Payer: MEDICARE

## 2023-02-10 VITALS
WEIGHT: 146 LBS | BODY MASS INDEX: 23.46 KG/M2 | DIASTOLIC BLOOD PRESSURE: 86 MMHG | OXYGEN SATURATION: 100 % | HEART RATE: 58 BPM | HEIGHT: 66 IN | SYSTOLIC BLOOD PRESSURE: 164 MMHG

## 2023-02-10 DIAGNOSIS — I10 PRIMARY HYPERTENSION: Primary | ICD-10-CM

## 2023-02-10 DIAGNOSIS — E78.00 HYPERCHOLESTEREMIA: ICD-10-CM

## 2023-02-10 DIAGNOSIS — Z86.73 HISTORY OF CVA (CEREBROVASCULAR ACCIDENT): ICD-10-CM

## 2023-02-10 RX ORDER — AMLODIPINE BESYLATE 10 MG/1
10 TABLET ORAL DAILY
Qty: 90 TABLET | Refills: 1
Start: 2023-02-10

## 2023-02-10 RX ORDER — ATORVASTATIN CALCIUM 20 MG/1
TABLET, FILM COATED ORAL DAILY
COMMUNITY

## 2023-02-10 NOTE — PROGRESS NOTES
HISTORY OF PRESENT ILLNESS  Erum Romero is a 67 y.o. male. 2/23 seen as a new patient for second opinion for hypertension and history of stroke. Experiencing side effects from amlodipine and feels weird after taking. Feeling of being drunk. Patient denies significant chest pain, SOB, palpitations, edema, dizziness        Review of Systems   Constitutional:  Negative for chills, fever, malaise/fatigue and weight loss. HENT:  Negative for nosebleeds. Eyes:  Negative for discharge. Respiratory:  Negative for cough, shortness of breath and wheezing. Cardiovascular:  Negative for chest pain, palpitations, orthopnea, claudication, leg swelling and PND. Gastrointestinal:  Negative for diarrhea, nausea and vomiting. Genitourinary:  Negative for dysuria and hematuria. Musculoskeletal:  Negative for joint pain. Skin:  Negative for rash. Neurological:  Negative for dizziness, seizures, loss of consciousness and headaches. Endo/Heme/Allergies:  Negative for polydipsia. Does not bruise/bleed easily. Psychiatric/Behavioral:  Negative for depression and substance abuse. The patient does not have insomnia. No Known Allergies    Past Medical History:   Diagnosis Date    H/O echocardiogram 05/2020    EF 55%, moderate to severe concentric LVH, advanced diastolic dysfunction, severe LAE, mild AI    Hyperlipidemia     Primary hypertension     TIA 2020    complete recovery       Family History   Problem Relation Age of Onset    Hypertension Mother     Heart Attack Mother 76    Cancer Father     Stroke Neg Hx        Social History     Tobacco Use    Smoking status: Never     Passive exposure: Never    Smokeless tobacco: Never   Vaping Use    Vaping Use: Never used   Substance Use Topics    Alcohol use: Not Currently     Comment: quit in 90s    Drug use: Never        Current Outpatient Medications   Medication Sig    atorvastatin (LIPITOR) 20 mg tablet Take  by mouth daily.     amLODIPine (NORVASC) 5 mg tablet Take 1 Tablet by mouth two (2) times a day. olmesartan-hydroCHLOROthiazide (BENICAR HCT) 40-12.5 mg per tablet Take 1 Tablet by mouth daily. aspirin delayed-release 81 mg tablet Take 1 Tab by mouth daily. No current facility-administered medications for this visit. History reviewed. No pertinent surgical history. Visit Vitals  BP (!) 164/86   Pulse (!) 58   Ht 5' 6\" (1.676 m)   Wt 66.2 kg (146 lb)   SpO2 100%   BMI 23.57 kg/m²       Diagnostic Studies:  I have reviewed the relevant tests done on the patient and show as follows  EKG tracings reviewed by me today. EKG Results       Procedure 720 Value Units Date/Time    AMB POC EKG ROUTINE W/ 12 LEADS, INTER & REP [[id number]] Resulted: 02/10/23 0932    Order Status: Completed Updated: 02/10/23 0927          XR Results (most recent):  No results found for this or any previous visit. 05/14/20    ECHO ADULT COMPLETE 05/15/2020 5/15/2020    Interpretation Summary  · Normal systolic function (ejection fraction normal). Dilated left ventricle. Moderately to severely increased wall thickness. Calculated left ventricular ejection fraction is 55%. Visually measured ejection fraction. No regional wall motion abnormality noted. Severe (grade 3) left ventricular diastolic dysfunction. · Agitated saline contrast study was performed and color flow Doppler was used. There was no shunting at baseline or with Valsalva. · Severely dilated left atrium. Left Atrium volume index is 73 mL/m2. · Aortic valve leaflet calcification present. Mild aortic valve regurgitation is present. · Pulmonary arterial systolic pressure is 29 mmHg. · No prior studies for comparison. Signed by: Miguelito Boyce DO on 5/15/2020 12:52 PM    Mr. Jayesh Johnson has a reminder for a \"due or due soon\" health maintenance. I have asked that he contact his primary care provider for follow-up on this health maintenance.   Physical Exam  Constitutional:       General: He is not in acute distress. Appearance: He is well-developed. HENT:      Head: Normocephalic and atraumatic. Mouth/Throat:      Dentition: Normal dentition. Eyes:      General: No scleral icterus. Right eye: No discharge. Left eye: No discharge. Neck:      Thyroid: No thyromegaly. Vascular: No carotid bruit or JVD. Cardiovascular:      Rate and Rhythm: Normal rate and regular rhythm. Pulses: Intact distal pulses. Heart sounds: Normal heart sounds, S1 normal and S2 normal. No murmur heard. No friction rub. No gallop. Pulmonary:      Effort: Pulmonary effort is normal.      Breath sounds: Normal breath sounds. No wheezing or rales. Abdominal:      Palpations: Abdomen is soft. There is no mass. Tenderness: There is no abdominal tenderness. Musculoskeletal:      Cervical back: Neck supple. Right lower leg: No edema. Left lower leg: No edema. Lymphadenopathy:      Cervical:      Right cervical: No superficial cervical adenopathy. Left cervical: No superficial cervical adenopathy. Skin:     General: Skin is warm and dry. Findings: No rash. Neurological:      Mental Status: He is alert and oriented to person, place, and time. Psychiatric:         Behavior: Behavior normal.       ASSESSMENT and PLAN      Diagnoses and all orders for this visit:    1. Primary hypertension  -     AMB POC EKG ROUTINE W/ 12 LEADS, INTER & REP  -     amLODIPine (NORVASC) 10 mg tablet; Take 1 Tablet by mouth daily. 2. History of CVA (cerebrovascular accident)    3. Hypercholesteremia  -     LIPID PANEL; Future  -     HEPATIC FUNCTION PANEL; Future        Pertinent laboratory and test data reviewed and discussed with patient.   See patient instructions also for other medical advice given    Medications Discontinued During This Encounter   Medication Reason    amLODIPine (NORVASC) 5 mg tablet        Follow-up and Dispositions    Return in about 6 months (around 8/10/2023), or if symptoms worsen or fail to improve, for BP log x 4-5 days post med changes, post test.       2/10/2023 blood pressure is not controlled in the office but home BPs per patient 120-130/70. He has completely recovered from his TIA in 2020. Statins were recently started. We will request lipids and liver functions in 6 weeks. EKG had no significant changes from previous 1 and his functional capacity is excellent so no cardiac work-up recommended now. Amlodipine was switched to 10 mg in the evening as he feels a little drunk after taking it but no blurring vision noted  Healthy lifestyle discussed and Mediterranean diet guidelines given.

## 2023-02-10 NOTE — PATIENT INSTRUCTIONS
Medications Discontinued During This Encounter   Medication Reason    amLODIPine (NORVASC) 5 mg tablet     After the recommended changes have been made in blood pressure medicines, patient advised to keep BP/HR(pulse rate) chart twice daily and bring us results in next 4 to 5 days. Patient may send the results via \"My Chart\" if desired. Please rest for 5-10 minutes before checking blood pressure. Sit on a comfortable chair without crossing the legs and put your arm on a table. We recommend that you use an upper arm cuff. Check the blood pressure 3 times each time you check the blood pressure and record the lowest reading. If you check the blood pressure in both arms, use the higher reading. Learning About the [facility] Diet  What is the Mediterranean diet? The Mediterranean diet is a style of eating rather than a diet plan. It features foods eaten in [place], [place], [place] and [place], and other countries along the [facility]. It emphasizes eating foods like fish, fruits, vegetables, beans, high-fiber breads and whole grains, nuts, and olive oil. This style of eating includes limited red meat, cheese, and sweets. Why choose the Mediterranean diet? A Mediterranean-style diet may improve heart health. It contains more fat than other heart-healthy diets. But the fats are mainly from nuts, unsaturated oils (such as fish oils and olive oil), and certain nut or seed oils (such as canola, soybean, or flaxseed oil). These fats may help protect the heart and blood vessels. How can you get started on the Mediterranean diet? Here are some things you can do to switch to a more Mediterranean way of eating. What to eat  Eat a variety of fruits and vegetables each day, such as grapes, blueberries, tomatoes, broccoli, peppers, figs, olives, spinach, eggplant, beans, lentils, and chickpeas.   Eat a variety of whole-grain foods each day, such as oats, brown rice, and whole wheat bread, pasta, and couscous. Eat fish at least 2 times a week. Try tuna, salmon, mackerel, lake trout, herring, or sardines. Eat moderate amounts of low-fat dairy products, such as milk, cheese, or yogurt. Eat moderate amounts of poultry and eggs. Choose healthy (unsaturated) fats, such as nuts, olive oil, and certain nut or seed oils like canola, soybean, and flaxseed. Limit unhealthy (saturated) fats, such as butter, palm oil, and coconut oil. And limit fats found in animal products, such as meat and dairy products made with whole milk. Try to eat red meat only a few times a month in very small amounts. Limit sweets and desserts to only a few times a week. This includes sugar-sweetened drinks like soda. The Mediterranean diet may also include red wine with your meal--1 glass each day for women and up to 2 glasses a day for men. Tips for eating at home  Use herbs, spices, garlic, lemon zest, and citrus juice instead of salt to add flavor to foods. Add avocado slices to your sandwich instead of rojo. Have fish for lunch or dinner instead of red meat. Brush the fish with olive oil, and broil or grill it. Sprinkle your salad with seeds or nuts instead of cheese. Cook with olive or canola oil instead of butter or oils that are high in saturated fat. Switch from 2% milk or whole milk to 1% or fat-free milk. Dip raw vegetables in a vinaigrette dressing or hummus instead of dips made from mayonnaise or sour cream.  Have a piece of fruit for dessert instead of a piece of cake. Try baked apples, or have some dried fruit. Tips for eating out  Try broiled, grilled, baked, or poached fish instead of having it fried or breaded. Ask your  to have your meals prepared with olive oil instead of butter. Order dishes made with marinara sauce or sauces made from olive oil. Avoid sauces made from cream or mayonnaise. Choose whole-grain breads, whole wheat pasta and pizza crust, brown rice, beans, and lentils.   Cut back on butter or margarine on bread. Instead, you can dip your bread in a small amount of olive oil. Ask for a side salad or grilled vegetables instead of french fries or chips. Where can you learn more? Go to http://www.raza.com/  Enter O407 in the search box to learn more about \"Learning About the Mediterranean Diet. \"  Current as of: May 9, 2022               Content Version: 13.4  © 2006-2022 [employer], [email or web address]. Care instructions adapted under license by [employer] (which disclaims liability or warranty for this information). If you have questions about a medical condition or this instruction, always ask your healthcare professional. [address] any warranty or liability for your use of this information.

## 2023-02-23 DIAGNOSIS — E78.00 HYPERCHOLESTEREMIA: Primary | ICD-10-CM

## 2023-04-05 RX ORDER — OLMESARTAN MEDOXOMIL AND HYDROCHLOROTHIAZIDE 40/12.5 40; 12.5 MG/1; MG/1
TABLET ORAL
Qty: 90 TABLET | Refills: 2 | Status: SHIPPED | OUTPATIENT
Start: 2023-04-05

## 2023-04-05 NOTE — TELEPHONE ENCOUNTER
Not sure who this patient is seeing, he is scheduled with both Dr. Monae Johnson and Dr. Debra Correa.

## 2023-07-10 RX ORDER — AMLODIPINE BESYLATE 5 MG/1
TABLET ORAL
Qty: 180 TABLET | Refills: 2 | OUTPATIENT
Start: 2023-07-10

## 2023-08-11 ENCOUNTER — OFFICE VISIT (OUTPATIENT)
Age: 73
End: 2023-08-11
Payer: MEDICARE

## 2023-08-11 VITALS
HEIGHT: 66 IN | BODY MASS INDEX: 23.14 KG/M2 | DIASTOLIC BLOOD PRESSURE: 76 MMHG | SYSTOLIC BLOOD PRESSURE: 169 MMHG | WEIGHT: 144 LBS | OXYGEN SATURATION: 100 % | HEART RATE: 61 BPM

## 2023-08-11 DIAGNOSIS — Z86.73 PERSONAL HISTORY OF TRANSIENT ISCHEMIC ATTACK (TIA), AND CEREBRAL INFARCTION WITHOUT RESIDUAL DEFICITS: ICD-10-CM

## 2023-08-11 DIAGNOSIS — I10 ESSENTIAL (PRIMARY) HYPERTENSION: Primary | ICD-10-CM

## 2023-08-11 DIAGNOSIS — E78.00 PURE HYPERCHOLESTEROLEMIA, UNSPECIFIED: ICD-10-CM

## 2023-08-11 PROCEDURE — 3077F SYST BP >= 140 MM HG: CPT | Performed by: INTERNAL MEDICINE

## 2023-08-11 PROCEDURE — 3078F DIAST BP <80 MM HG: CPT | Performed by: INTERNAL MEDICINE

## 2023-08-11 PROCEDURE — G8420 CALC BMI NORM PARAMETERS: HCPCS | Performed by: INTERNAL MEDICINE

## 2023-08-11 PROCEDURE — 99214 OFFICE O/P EST MOD 30 MIN: CPT | Performed by: INTERNAL MEDICINE

## 2023-08-11 PROCEDURE — 3017F COLORECTAL CA SCREEN DOC REV: CPT | Performed by: INTERNAL MEDICINE

## 2023-08-11 PROCEDURE — 1123F ACP DISCUSS/DSCN MKR DOCD: CPT | Performed by: INTERNAL MEDICINE

## 2023-08-11 PROCEDURE — 1036F TOBACCO NON-USER: CPT | Performed by: INTERNAL MEDICINE

## 2023-08-11 PROCEDURE — G8427 DOCREV CUR MEDS BY ELIG CLIN: HCPCS | Performed by: INTERNAL MEDICINE

## 2023-08-11 RX ORDER — ROSUVASTATIN CALCIUM 10 MG/1
10 TABLET, COATED ORAL NIGHTLY
Qty: 30 TABLET | Refills: 3 | Status: SHIPPED | OUTPATIENT
Start: 2023-08-11

## 2023-08-11 RX ORDER — ATORVASTATIN CALCIUM 20 MG/1
TABLET, FILM COATED ORAL DAILY
COMMUNITY
End: 2023-08-11 | Stop reason: SINTOL

## 2023-08-11 ASSESSMENT — ENCOUNTER SYMPTOMS
GASTROINTESTINAL NEGATIVE: 1
EYES NEGATIVE: 1
RESPIRATORY NEGATIVE: 1

## 2023-08-11 NOTE — PROGRESS NOTES
1. Have you been to the ER, urgent care clinic since your last visit? Hospitalized since your last visit?     no      2. Where do you normally have your labs drawn?   [facility]    3. Do you need any refills today?   no    4. Which local pharmacy do you use (enter pharmacy)? [facility]    5. Which mail order pharmacy do you use (enter pharmacy)? 6. Are you here for surgical clearance and if so who will be doing your     procedure/surgery (care team)?    no
effects       Follow-up and Dispositions    Return in about 6 months (around 2/11/2024), or if symptoms worsen or fail to improve, for post test/procedure. Return to ER if any significant CP not relieved by s/l NTG, severe SOB, severe palpitations, loss of consciousness    8/11/2023 blood pressure high in the office but controlled well in the range of 1 20-1 30 over 70s at home. Requested to bring me the home chart and continue same medications. Lipids not done as he was not able to take Lipitor due to the fact that he felt his throat was closing up. However he denied any significant dyspnea or wheezing and cannot describe this in any more detail. Will try Crestor instead and follow the labs. He has excellent exercise habits already which he should continue.

## 2024-01-15 RX ORDER — OLMESARTAN MEDOXOMIL AND HYDROCHLOROTHIAZIDE 40/12.5 40; 12.5 MG/1; MG/1
TABLET ORAL
Qty: 90 TABLET | Refills: 2 | Status: SHIPPED | OUTPATIENT
Start: 2024-01-15

## 2024-02-07 ENCOUNTER — OFFICE VISIT (OUTPATIENT)
Age: 74
End: 2024-02-07
Payer: MEDICARE

## 2024-02-07 VITALS
HEIGHT: 66 IN | SYSTOLIC BLOOD PRESSURE: 183 MMHG | BODY MASS INDEX: 23.46 KG/M2 | DIASTOLIC BLOOD PRESSURE: 101 MMHG | HEART RATE: 61 BPM | WEIGHT: 146 LBS | OXYGEN SATURATION: 97 %

## 2024-02-07 DIAGNOSIS — R94.31 ABNORMAL EKG: ICD-10-CM

## 2024-02-07 DIAGNOSIS — E78.5 DYSLIPIDEMIA: ICD-10-CM

## 2024-02-07 DIAGNOSIS — I10 ESSENTIAL (PRIMARY) HYPERTENSION: Primary | ICD-10-CM

## 2024-02-07 DIAGNOSIS — Z86.73 HISTORY OF CVA (CEREBROVASCULAR ACCIDENT): ICD-10-CM

## 2024-02-07 PROCEDURE — 3080F DIAST BP >= 90 MM HG: CPT | Performed by: INTERNAL MEDICINE

## 2024-02-07 PROCEDURE — 93000 ELECTROCARDIOGRAM COMPLETE: CPT | Performed by: INTERNAL MEDICINE

## 2024-02-07 PROCEDURE — G8427 DOCREV CUR MEDS BY ELIG CLIN: HCPCS | Performed by: INTERNAL MEDICINE

## 2024-02-07 PROCEDURE — 1123F ACP DISCUSS/DSCN MKR DOCD: CPT | Performed by: INTERNAL MEDICINE

## 2024-02-07 PROCEDURE — G8420 CALC BMI NORM PARAMETERS: HCPCS | Performed by: INTERNAL MEDICINE

## 2024-02-07 PROCEDURE — G8484 FLU IMMUNIZE NO ADMIN: HCPCS | Performed by: INTERNAL MEDICINE

## 2024-02-07 PROCEDURE — 3017F COLORECTAL CA SCREEN DOC REV: CPT | Performed by: INTERNAL MEDICINE

## 2024-02-07 PROCEDURE — 1036F TOBACCO NON-USER: CPT | Performed by: INTERNAL MEDICINE

## 2024-02-07 PROCEDURE — 3077F SYST BP >= 140 MM HG: CPT | Performed by: INTERNAL MEDICINE

## 2024-02-07 PROCEDURE — 99214 OFFICE O/P EST MOD 30 MIN: CPT | Performed by: INTERNAL MEDICINE

## 2024-02-07 RX ORDER — AMLODIPINE BESYLATE 10 MG/1
10 TABLET ORAL DAILY
Qty: 90 TABLET | Refills: 3
Start: 2024-02-07

## 2024-02-07 ASSESSMENT — ENCOUNTER SYMPTOMS
EYES NEGATIVE: 1
GASTROINTESTINAL NEGATIVE: 1
RESPIRATORY NEGATIVE: 1

## 2024-02-07 NOTE — PROGRESS NOTES
Room 7    1. Have you been to the ER, urgent care clinic since your last visit?  Hospitalized since your last visit? No    2.  Where do you normally have your labs drawn?  [facility]      3. Do you need any refills today? None      4. Which local pharmacy do you use?   [facility]      5. Which mail order pharmacy do you use?   None       6. Are you here for surgical clearance? No, who will be doing your procedure/surgery (care team)?   None  
05/15/2020 5/15/2020      Interpretation Summary   · Normal systolic function (ejection fraction normal). Dilated left ventricle. Moderately to severely increased wall thickness. Calculated left ventricular ejection  fraction is 55%. Visually measured ejection fraction. No regional wall motion abnormality noted. Severe (grade 3) left ventricular diastolic dysfunction.   · Agitated saline contrast study was performed and color flow Doppler was used. There was no shunting at baseline or with Valsalva.   · Severely dilated left atrium. Left Atrium volume index is 73 mL/m2.   · Aortic valve leaflet calcification present. Mild aortic valve regurgitation is present.   · Pulmonary arterial systolic pressure is 29 mmHg.   · No prior studies for comparison.      Signed by: Luz Swenson DO on 5/15/2020 12:52 PM     ASSESSMENT & PLAN    Lab Results   Component Value Date    CHOL 177 05/15/2020     Lab Results   Component Value Date    TRIG 44 05/15/2020     Lab Results   Component Value Date    HDL 63 (H) 05/15/2020     Lab Results   Component Value Date    LDLCALC 105.2 (H) 05/15/2020     No results found for: \"VLDL\"           2/10/2023 blood pressure is not controlled in the office but home BPs per patient 120-130/70.  He has completely recovered from his TIA in 2020.  Statins were recently started.  We will request lipids and liver functions in 6 weeks.  EKG had  no significant changes from previous 1 and his functional capacity is excellent so no cardiac work-up recommended now.  Amlodipine was switched to 10 mg in the evening as he feels a little drunk after taking it but no blurring vision noted  Healthy  lifestyle discussed and Mediterranean diet guidelines given.    8/11/2023 blood pressure high in the office but controlled well in the range of 120-130 over 70s at home.  Requested to bring me the home chart and continue same medications.  Lipids not done as he was not able to take Lipitor due to the fact that he

## 2024-02-08 ENCOUNTER — TELEPHONE (OUTPATIENT)
Age: 74
End: 2024-02-08